# Patient Record
Sex: MALE | Race: WHITE | NOT HISPANIC OR LATINO | Employment: FULL TIME | URBAN - NONMETROPOLITAN AREA
[De-identification: names, ages, dates, MRNs, and addresses within clinical notes are randomized per-mention and may not be internally consistent; named-entity substitution may affect disease eponyms.]

---

## 2017-11-15 ENCOUNTER — OFFICE VISIT (OUTPATIENT)
Dept: UROLOGY | Facility: CLINIC | Age: 37
End: 2017-11-15

## 2017-11-15 VITALS — BODY MASS INDEX: 32.14 KG/M2 | HEIGHT: 66 IN | WEIGHT: 200 LBS

## 2017-11-15 DIAGNOSIS — Z30.09 ENCOUNTER FOR VASECTOMY COUNSELING: Primary | ICD-10-CM

## 2017-11-15 PROCEDURE — 99202 OFFICE O/P NEW SF 15 MIN: CPT | Performed by: UROLOGY

## 2017-11-15 RX ORDER — LEVOTHYROXINE SODIUM 175 UG/1
150 TABLET ORAL DAILY
Refills: 0 | COMMUNITY
Start: 2017-10-12

## 2017-11-15 RX ORDER — CARVEDILOL 6.25 MG/1
6.25 TABLET ORAL 2 TIMES DAILY WITH MEALS
Refills: 0 | COMMUNITY
Start: 2017-10-12

## 2017-11-15 RX ORDER — PANTOPRAZOLE SODIUM 40 MG/1
40 TABLET, DELAYED RELEASE ORAL DAILY
Refills: 0 | COMMUNITY
Start: 2017-10-14

## 2017-11-15 NOTE — PROGRESS NOTES
Chief Complaint:          Chief Complaint   Patient presents with   • Vasectomy Consult     New Patient        HPI:   37 y.o. male.    HPI  Pt and I discussed risks of vasectomy.  Infection, chronic pain, bleeding and recanalization.      Past Medical History:        Past Medical History:   Diagnosis Date   • Diabetes mellitus    • Hypertension          Current Meds:     Current Outpatient Prescriptions   Medication Sig Dispense Refill   • carvedilol (COREG) 6.25 MG tablet 6.25 mg.  0   • levothyroxine (SYNTHROID, LEVOTHROID) 175 MCG tablet 175 mcg.  0   • metFORMIN (GLUCOPHAGE) 500 MG tablet 500 mg.  0   • pantoprazole (PROTONIX) 40 MG EC tablet 40 mg.  0     No current facility-administered medications for this visit.         Allergies:      No Known Allergies     Past Surgical History:     History reviewed. No pertinent surgical history.      Social History:     Social History     Social History   • Marital status: Single     Spouse name: N/A   • Number of children: N/A   • Years of education: N/A     Occupational History   • Not on file.     Social History Main Topics   • Smoking status: Former Smoker   • Smokeless tobacco: Never Used   • Alcohol use No   • Drug use: No   • Sexual activity: Not on file     Other Topics Concern   • Not on file     Social History Narrative   • No narrative on file       Family History:     Family History   Problem Relation Age of Onset   • Hypertension Father    • Hypertension Mother    • Heart disease Mother    • Urolithiasis Maternal Uncle        Review of Systems:     Review of Systems    Physical Exam:     Physical Exam   Constitutional: He is oriented to person, place, and time.   HENT:   Head: Normocephalic and atraumatic.   Right Ear: External ear normal.   Left Ear: External ear normal.   Nose: Nose normal.   Mouth/Throat: Oropharynx is clear and moist.   Eyes: Conjunctivae and EOM are normal. Pupils are equal, round, and reactive to light.   Neck: Normal range of motion.  Neck supple. No thyromegaly present.   Cardiovascular: Normal rate, regular rhythm, normal heart sounds and intact distal pulses.    No murmur heard.  Pulmonary/Chest: Effort normal and breath sounds normal. No respiratory distress. He has no wheezes. He has no rales. He exhibits no tenderness.   Abdominal: Soft. Bowel sounds are normal. He exhibits no distension and no mass. There is no tenderness. No hernia.   Genitourinary: Penis normal.   Musculoskeletal: Normal range of motion. He exhibits no edema or tenderness.   Lymphadenopathy:     He has no cervical adenopathy.   Neurological: He is alert and oriented to person, place, and time. No cranial nerve deficit. He exhibits normal muscle tone. Coordination normal.   Skin: Skin is warm. No rash noted.   Psychiatric: He has a normal mood and affect. His behavior is normal. Judgment and thought content normal.   Nursing note and vitals reviewed.      Procedure:     No notes on file      Assessment:     Encounter Diagnosis   Name Primary?   • Encounter for vasectomy counseling Yes     No orders of the defined types were placed in this encounter.      Plan:   Schedule vasectomy office rx xanax    Counseling was given to patient for the following topics risks and benefits of treatment options. and the interim medical history and current results were reviewed.  A treatment plan with follow-up was made. Total time of the encounter was 26 minutes and 26 minutes were spent discussing Encounter for vasectomy counseling [Z30.09] face-to-face.       This document has been electronically signed by Gene Hopkins MD November 15, 2017 4:04 PM

## 2017-12-27 ENCOUNTER — PROCEDURE VISIT (OUTPATIENT)
Dept: UROLOGY | Facility: CLINIC | Age: 37
End: 2017-12-27

## 2017-12-27 DIAGNOSIS — Z30.2 ENCOUNTER FOR VASECTOMY: Primary | ICD-10-CM

## 2017-12-27 PROCEDURE — 55250 REMOVAL OF SPERM DUCT(S): CPT | Performed by: UROLOGY

## 2017-12-27 RX ORDER — CEPHALEXIN 250 MG/1
250 CAPSULE ORAL 3 TIMES DAILY
Qty: 21 CAPSULE | Refills: 0 | Status: SHIPPED | OUTPATIENT
Start: 2017-12-27 | End: 2020-11-06

## 2017-12-27 RX ORDER — HYDROCODONE BITARTRATE AND ACETAMINOPHEN 5; 325 MG/1; MG/1
TABLET ORAL
Qty: 20 TABLET | Refills: 0 | Status: SHIPPED | OUTPATIENT
Start: 2017-12-27 | End: 2020-11-06

## 2017-12-27 NOTE — PROGRESS NOTES
Chief Complaint:          Chief Complaint   Patient presents with   • Sterilization     Office Vasectomy       HPI:   37 y.o. male.    HPI        Past Medical History:        Past Medical History:   Diagnosis Date   • Diabetes mellitus    • Hypertension          Current Meds:     Current Outpatient Prescriptions   Medication Sig Dispense Refill   • carvedilol (COREG) 6.25 MG tablet 6.25 mg.  0   • cephalexin (KEFLEX) 250 MG capsule Take 1 capsule by mouth 3 (Three) Times a Day. 21 capsule 0   • HYDROcodone-acetaminophen (NORCO) 5-325 MG per tablet 1 to 2 Tablets Every 6 Hours as Needed for PAIN 20 tablet 0   • levothyroxine (SYNTHROID, LEVOTHROID) 175 MCG tablet 175 mcg.  0   • metFORMIN (GLUCOPHAGE) 500 MG tablet 500 mg.  0   • pantoprazole (PROTONIX) 40 MG EC tablet 40 mg.  0     No current facility-administered medications for this visit.         Allergies:      No Known Allergies     Past Surgical History:     History reviewed. No pertinent surgical history.      Social History:     Social History     Social History   • Marital status: Single     Spouse name: N/A   • Number of children: N/A   • Years of education: N/A     Occupational History   • Not on file.     Social History Main Topics   • Smoking status: Former Smoker   • Smokeless tobacco: Never Used   • Alcohol use No   • Drug use: No   • Sexual activity: Not on file     Other Topics Concern   • Not on file     Social History Narrative       Family History:     Family History   Problem Relation Age of Onset   • Hypertension Father    • Hypertension Mother    • Heart disease Mother    • Urolithiasis Maternal Uncle        Review of Systems:     Review of Systems  Vasectomy performed under lidocaine/marcaine local.  Midline penal scrotal incision performed then vas clamp placed on vas and sharpened hemostat used to dissect left vas.  Clips applied to proximal and distal vas and to specimen on left.  Identical procedure done on the right except after vas was  transected the proximal vas retracted into the wound and then it was retrieved with vas clamp and 2 clips reapplied.  Wound was closed with chromic.  Physical Exam:     Physical Exam    Procedure:     No notes on file      Assessment:     Encounter Diagnosis   Name Primary?   • Encounter for vasectomy Yes     No orders of the defined types were placed in this encounter.      Plan:   Will see pt in 2 months and he will do protected intercourse until negative sperm analysis    Counseling was given to patient for the following topics instructions for management. and the interim medical history and current results were reviewed.  A treatment plan with follow-up was made. Total time of the encounter was 35 minutes and 35 minutes were spent discussing Encounter for vasectomy [Z30.2] face-to-face.       This document has been electronically signed by Gene Hopkins MD December 27, 2017 3:41 PM

## 2018-02-19 ENCOUNTER — LAB (OUTPATIENT)
Dept: UROLOGY | Facility: CLINIC | Age: 38
End: 2018-02-19

## 2018-02-19 DIAGNOSIS — Z98.52 STATUS POST VASECTOMY: Primary | ICD-10-CM

## 2018-02-19 LAB — SPERM - POST VASECTOMY: NORMAL

## 2018-02-19 PROCEDURE — 89321 SEMEN ANAL SPERM DETECTION: CPT | Performed by: UROLOGY

## 2018-02-21 ENCOUNTER — TELEPHONE (OUTPATIENT)
Dept: UROLOGY | Facility: CLINIC | Age: 38
End: 2018-02-21

## 2018-02-21 NOTE — TELEPHONE ENCOUNTER
I called the patient and let him know that his semen sample had no sperm seen.  He asked if he still needed to come to his follow up and I told him yes so that Dr. Hardy could check the incision site.

## 2018-02-28 ENCOUNTER — OFFICE VISIT (OUTPATIENT)
Dept: UROLOGY | Facility: CLINIC | Age: 38
End: 2018-02-28

## 2018-02-28 VITALS — HEIGHT: 66 IN | BODY MASS INDEX: 32.14 KG/M2 | WEIGHT: 200 LBS

## 2018-02-28 DIAGNOSIS — Z98.52 STATUS POST VASECTOMY: Primary | ICD-10-CM

## 2018-02-28 PROCEDURE — 99024 POSTOP FOLLOW-UP VISIT: CPT | Performed by: UROLOGY

## 2018-02-28 NOTE — PROGRESS NOTES
Chief Complaint:          Chief Complaint   Patient presents with   • Vasectomy f/u     patient feels like he hasn't fully healed, feels like it's pulled a little tight.        HPI:   37 y.o. male.    HPI  Pt has some fullness on the right side.  His sperm analysis is negative.      Past Medical History:        Past Medical History:   Diagnosis Date   • Diabetes mellitus    • Hypertension          Current Meds:     Current Outpatient Prescriptions   Medication Sig Dispense Refill   • carvedilol (COREG) 6.25 MG tablet 6.25 mg.  0   • cephalexin (KEFLEX) 250 MG capsule Take 1 capsule by mouth 3 (Three) Times a Day. 21 capsule 0   • HYDROcodone-acetaminophen (NORCO) 5-325 MG per tablet 1 to 2 Tablets Every 6 Hours as Needed for PAIN 20 tablet 0   • levothyroxine (SYNTHROID, LEVOTHROID) 175 MCG tablet 175 mcg.  0   • metFORMIN (GLUCOPHAGE) 500 MG tablet 500 mg.  0   • pantoprazole (PROTONIX) 40 MG EC tablet 40 mg.  0     No current facility-administered medications for this visit.         Allergies:      No Known Allergies     Past Surgical History:     History reviewed. No pertinent surgical history.      Social History:     Social History     Social History   • Marital status: Single     Spouse name: N/A   • Number of children: N/A   • Years of education: N/A     Occupational History   • Not on file.     Social History Main Topics   • Smoking status: Former Smoker   • Smokeless tobacco: Never Used   • Alcohol use No   • Drug use: No   • Sexual activity: Not on file     Other Topics Concern   • Not on file     Social History Narrative       Family History:     Family History   Problem Relation Age of Onset   • Hypertension Father    • Hypertension Mother    • Heart disease Mother    • Urolithiasis Maternal Uncle        Review of Systems:     Review of Systems   Constitutional: Negative for chills, fatigue and fever.   HENT: Negative for congestion and sinus pain.    Eyes: Negative for pain.   Respiratory: Negative for  cough, chest tightness and wheezing.    Cardiovascular: Negative for chest pain.   Gastrointestinal: Negative for constipation, diarrhea, nausea and vomiting.   Endocrine: Negative for cold intolerance and heat intolerance.   Genitourinary: Positive for testicular pain. Negative for difficulty urinating, dysuria and hematuria.   Musculoskeletal: Negative for back pain and neck pain.   Skin: Negative for rash and wound.   Neurological: Negative for dizziness and headaches.   Hematological: Does not bruise/bleed easily.   Psychiatric/Behavioral: Negative for agitation. The patient is not nervous/anxious.            Physical Exam:     Physical Exam   Constitutional: He is oriented to person, place, and time.   HENT:   Head: Normocephalic and atraumatic.   Right Ear: External ear normal.   Left Ear: External ear normal.   Nose: Nose normal.   Mouth/Throat: Oropharynx is clear and moist.   Eyes: Conjunctivae and EOM are normal. Pupils are equal, round, and reactive to light.   Neck: Normal range of motion. Neck supple. No thyromegaly present.   Cardiovascular: Normal rate, regular rhythm, normal heart sounds and intact distal pulses.    No murmur heard.  Pulmonary/Chest: Effort normal and breath sounds normal. No respiratory distress. He has no wheezes. He has no rales. He exhibits no tenderness.   Abdominal: Soft. Bowel sounds are normal. He exhibits no distension and no mass. There is no tenderness. No hernia.   Genitourinary: Penis normal.   Genitourinary Comments: Pt has a little more sperm granuloma on right than left     Musculoskeletal: Normal range of motion. He exhibits no edema or tenderness.   Lymphadenopathy:     He has no cervical adenopathy.   Neurological: He is alert and oriented to person, place, and time. No cranial nerve deficit. He exhibits normal muscle tone. Coordination normal.   Skin: Skin is warm. No rash noted.   Psychiatric: He has a normal mood and affect. His behavior is normal. Judgment and  thought content normal.   Nursing note and vitals reviewed.      Procedure:     No notes on file      Assessment:     Encounter Diagnosis   Name Primary?   • Status post vasectomy Yes     No orders of the defined types were placed in this encounter.      Plan:   If pt still has right orchalgia will see him back in 6 months    Counseling was given to patient for the following topics instructions for management as follows: orchalgia. The interim medical history and current results were reviewed.  A treatment plan with follow-up was made for Status post vasectomy [Z98.52]. I spent 11 minutes face to face with Camilo Harper and 95 percentage was spent in counseling.    This document has been electronically signed by Gene Hopkins MD February 28, 2018 5:01 PM

## 2018-03-30 ENCOUNTER — TRANSCRIBE ORDERS (OUTPATIENT)
Dept: ADMINISTRATIVE | Facility: HOSPITAL | Age: 38
End: 2018-03-30

## 2018-03-30 ENCOUNTER — LAB (OUTPATIENT)
Dept: LAB | Facility: HOSPITAL | Age: 38
End: 2018-03-30

## 2018-03-30 DIAGNOSIS — E11.9 DIABETES MELLITUS WITHOUT COMPLICATION (HCC): ICD-10-CM

## 2018-03-30 DIAGNOSIS — E03.9 ACQUIRED HYPOTHYROIDISM: ICD-10-CM

## 2018-03-30 DIAGNOSIS — I10 BENIGN HYPERTENSION: Primary | ICD-10-CM

## 2018-03-30 DIAGNOSIS — I10 BENIGN HYPERTENSION: ICD-10-CM

## 2018-03-30 DIAGNOSIS — Z13.220 SCREENING FOR LIPOID DISORDERS: ICD-10-CM

## 2018-03-30 LAB
ALBUMIN SERPL-MCNC: 4.1 G/DL (ref 3.5–5)
ALBUMIN UR-MCNC: 22.5 MG/L
ALBUMIN/GLOB SERPL: 1.7 G/DL (ref 1.5–2.5)
ALP SERPL-CCNC: 115 U/L (ref 40–129)
ALT SERPL W P-5'-P-CCNC: 117 U/L (ref 10–44)
ANION GAP SERPL CALCULATED.3IONS-SCNC: 6.7 MMOL/L (ref 3.6–11.2)
AST SERPL-CCNC: 57 U/L (ref 10–34)
BASOPHILS # BLD AUTO: 0.03 10*3/MM3 (ref 0–0.3)
BASOPHILS NFR BLD AUTO: 0.3 % (ref 0–2)
BILIRUB SERPL-MCNC: 0.7 MG/DL (ref 0.2–1.8)
BUN BLD-MCNC: 9 MG/DL (ref 7–21)
BUN/CREAT SERPL: 12.2 (ref 7–25)
CALCIUM SPEC-SCNC: 8.9 MG/DL (ref 7.7–10)
CHLORIDE SERPL-SCNC: 105 MMOL/L (ref 99–112)
CHOLEST SERPL-MCNC: 108 MG/DL (ref 0–200)
CO2 SERPL-SCNC: 25.3 MMOL/L (ref 24.3–31.9)
CREAT BLD-MCNC: 0.74 MG/DL (ref 0.43–1.29)
DEPRECATED RDW RBC AUTO: 37.2 FL (ref 37–54)
EOSINOPHIL # BLD AUTO: 0.21 10*3/MM3 (ref 0–0.7)
EOSINOPHIL NFR BLD AUTO: 1.9 % (ref 0–5)
ERYTHROCYTE [DISTWIDTH] IN BLOOD BY AUTOMATED COUNT: 13.2 % (ref 11.5–14.5)
GFR SERPL CREATININE-BSD FRML MDRD: 120 ML/MIN/1.73
GLOBULIN UR ELPH-MCNC: 2.4 GM/DL
GLUCOSE BLD-MCNC: 249 MG/DL (ref 70–110)
HBA1C MFR BLD: 9.6 % (ref 4.5–5.7)
HCT VFR BLD AUTO: 43.3 % (ref 42–52)
HDLC SERPL-MCNC: 33 MG/DL (ref 60–100)
HGB BLD-MCNC: 15.7 G/DL (ref 14–18)
IMM GRANULOCYTES # BLD: 0.08 10*3/MM3 (ref 0–0.03)
IMM GRANULOCYTES NFR BLD: 0.7 % (ref 0–0.5)
LDLC SERPL CALC-MCNC: 49 MG/DL (ref 0–100)
LDLC/HDLC SERPL: 1.5 {RATIO}
LYMPHOCYTES # BLD AUTO: 3.49 10*3/MM3 (ref 1–3)
LYMPHOCYTES NFR BLD AUTO: 31 % (ref 21–51)
MCH RBC QN AUTO: 29 PG (ref 27–33)
MCHC RBC AUTO-ENTMCNC: 36.3 G/DL (ref 33–37)
MCV RBC AUTO: 80 FL (ref 80–94)
MONOCYTES # BLD AUTO: 0.93 10*3/MM3 (ref 0.1–0.9)
MONOCYTES NFR BLD AUTO: 8.3 % (ref 0–10)
NEUTROPHILS # BLD AUTO: 6.51 10*3/MM3 (ref 1.4–6.5)
NEUTROPHILS NFR BLD AUTO: 57.8 % (ref 30–70)
OSMOLALITY SERPL CALC.SUM OF ELEC: 280.9 MOSM/KG (ref 273–305)
PLATELET # BLD AUTO: 239 10*3/MM3 (ref 130–400)
PMV BLD AUTO: 11.3 FL (ref 6–10)
POTASSIUM BLD-SCNC: 3.6 MMOL/L (ref 3.5–5.3)
PROT SERPL-MCNC: 6.5 G/DL (ref 6–8)
RBC # BLD AUTO: 5.41 10*6/MM3 (ref 4.7–6.1)
SODIUM BLD-SCNC: 137 MMOL/L (ref 135–153)
T4 SERPL-MCNC: 12 MCG/DL (ref 4.5–10.9)
TRIGL SERPL-MCNC: 128 MG/DL (ref 0–150)
TSH SERPL DL<=0.05 MIU/L-ACNC: 0.03 MIU/ML (ref 0.55–4.78)
VLDLC SERPL-MCNC: 25.6 MG/DL
WBC NRBC COR # BLD: 11.25 10*3/MM3 (ref 4.5–12.5)

## 2018-03-30 PROCEDURE — 80061 LIPID PANEL: CPT

## 2018-03-30 PROCEDURE — 82043 UR ALBUMIN QUANTITATIVE: CPT

## 2018-03-30 PROCEDURE — 84436 ASSAY OF TOTAL THYROXINE: CPT

## 2018-03-30 PROCEDURE — 80053 COMPREHEN METABOLIC PANEL: CPT

## 2018-03-30 PROCEDURE — 84443 ASSAY THYROID STIM HORMONE: CPT

## 2018-03-30 PROCEDURE — 83036 HEMOGLOBIN GLYCOSYLATED A1C: CPT

## 2018-03-30 PROCEDURE — 36415 COLL VENOUS BLD VENIPUNCTURE: CPT

## 2018-03-30 PROCEDURE — 85025 COMPLETE CBC W/AUTO DIFF WBC: CPT

## 2018-08-02 ENCOUNTER — OFFICE VISIT (OUTPATIENT)
Dept: UROLOGY | Facility: CLINIC | Age: 38
End: 2018-08-02

## 2018-08-02 VITALS — HEIGHT: 66 IN | WEIGHT: 200 LBS | BODY MASS INDEX: 32.14 KG/M2

## 2018-08-02 DIAGNOSIS — N50.819 ORCHALGIA: Primary | ICD-10-CM

## 2018-08-02 PROCEDURE — 99213 OFFICE O/P EST LOW 20 MIN: CPT | Performed by: UROLOGY

## 2018-08-02 NOTE — PROGRESS NOTES
Chief Complaint:          Right orchalgia    HPI:   38 y.o. male.  Pt had a vasectomy back in [facility] and had testicle ache that lasted about 6 weeks and then it went away until 1 week ago when the right side felt squeezed.  This squeezing feeling has progressively gotten worse.  No fever or chills.  HPI      Past Medical History:        Past Medical History:   Diagnosis Date   • Diabetes mellitus (CMS/HCC)    • Hypertension          Current Meds:     Current Outpatient Prescriptions   Medication Sig Dispense Refill   • carvedilol (COREG) 6.25 MG tablet 6.25 mg.  0   • levothyroxine (SYNTHROID, LEVOTHROID) 175 MCG tablet 175 mcg.  0   • metFORMIN (GLUCOPHAGE) 500 MG tablet 500 mg.  0   • pantoprazole (PROTONIX) 40 MG EC tablet 40 mg.  0   • cephalexin (KEFLEX) 250 MG capsule Take 1 capsule by mouth 3 (Three) Times a Day. 21 capsule 0   • HYDROcodone-acetaminophen (NORCO) 5-325 MG per tablet 1 to 2 Tablets Every 6 Hours as Needed for PAIN 20 tablet 0     No current facility-administered medications for this visit.         Allergies:      No Known Allergies     Past Surgical History:     No past surgical history on file.      Social History:     Social History     Social History   • Marital status: Single     Spouse name: N/A   • Number of children: N/A   • Years of education: N/A     Occupational History   • Not on file.     Social History Main Topics   • Smoking status: Former Smoker   • Smokeless tobacco: Never Used   • Alcohol use No   • Drug use: No   • Sexual activity: Not on file     Other Topics Concern   • Not on file     Social History Narrative   • No narrative on file       Family History:     Family History   Problem Relation Age of Onset   • Hypertension Father    • Hypertension Mother    • Heart disease Mother    • Urolithiasis Maternal Uncle        Review of Systems:     Review of Systems   Constitutional: Negative for chills, fatigue and fever.   Respiratory: Negative for cough, shortness of breath  "and wheezing.    Cardiovascular: Negative for leg swelling.   Gastrointestinal: Negative for abdominal pain, nausea and vomiting.   Musculoskeletal: Negative for back pain and joint swelling.   Neurological: Negative for dizziness and headaches.   Psychiatric/Behavioral: Negative for confusion.       I have reviewed the follow portions of the patient's history and confirmed they are accurate today:  allergies, current medications, past family history, past medical history, past social history, past surgical history, problem list and ROS  Physical Exam:     Physical Exam   Constitutional: He is oriented to person, place, and time.   HENT:   Head: Normocephalic and atraumatic.   Right Ear: External ear normal.   Left Ear: External ear normal.   Nose: Nose normal.   Mouth/Throat: Oropharynx is clear and moist.   Eyes: Pupils are equal, round, and reactive to light. Conjunctivae and EOM are normal.   Neck: Normal range of motion. Neck supple. No thyromegaly present.   Cardiovascular: Normal rate, regular rhythm, normal heart sounds and intact distal pulses.    No murmur heard.  Pulmonary/Chest: Effort normal and breath sounds normal. No respiratory distress. He has no wheezes. He has no rales. He exhibits no tenderness.   Abdominal: Soft. Bowel sounds are normal. He exhibits no distension and no mass. There is no tenderness. No hernia.   Genitourinary: Rectum normal, prostate normal and penis normal.   Musculoskeletal: Normal range of motion. He exhibits no edema or tenderness.   Lymphadenopathy:     He has no cervical adenopathy.   Neurological: He is alert and oriented to person, place, and time. No cranial nerve deficit. He exhibits normal muscle tone. Coordination normal.   Skin: Skin is warm. No rash noted.   Psychiatric: He has a normal mood and affect. His behavior is normal. Judgment and thought content normal.   Nursing note and vitals reviewed.      Ht 167.6 cm (65.98\")   Wt 90.7 kg (200 lb)   BMI 32.30 " kg/m²    Procedure:         Assessment:     Encounter Diagnosis   Name Primary?   • Orchalgia Yes     No orders of the defined types were placed in this encounter.      Plan:   I would have pt try motrin and will see pt back in 2 months    Patient's Body mass index is 32.3 kg/m². BMI is above normal parameters. Recommendations include: educational material.      Counseling was given to patient for the following topics instructions for management as follows: testicular pain. The interim medical history and current results were reviewed.  A treatment plan with follow-up was made for Orchalgia [N50.819].  This document has been electronically signed by Gene Hopkins MD August 2, 2018 4:01 PM

## 2018-10-17 ENCOUNTER — OFFICE VISIT (OUTPATIENT)
Dept: CARDIOLOGY | Facility: CLINIC | Age: 38
End: 2018-10-17

## 2018-10-17 ENCOUNTER — CLINICAL SUPPORT (OUTPATIENT)
Dept: CARDIOLOGY | Facility: CLINIC | Age: 38
End: 2018-10-17

## 2018-10-17 VITALS
DIASTOLIC BLOOD PRESSURE: 75 MMHG | HEIGHT: 66 IN | BODY MASS INDEX: 33.27 KG/M2 | SYSTOLIC BLOOD PRESSURE: 119 MMHG | WEIGHT: 207 LBS | RESPIRATION RATE: 16 BRPM | HEART RATE: 96 BPM

## 2018-10-17 DIAGNOSIS — R94.31 ABNORMAL EKG: ICD-10-CM

## 2018-10-17 DIAGNOSIS — R42 DIZZINESS: ICD-10-CM

## 2018-10-17 DIAGNOSIS — Z82.49 FAMILY HISTORY OF PREMATURE CORONARY ARTERY DISEASE: ICD-10-CM

## 2018-10-17 DIAGNOSIS — E11.9 TYPE 2 DIABETES MELLITUS WITHOUT COMPLICATION, WITHOUT LONG-TERM CURRENT USE OF INSULIN (HCC): ICD-10-CM

## 2018-10-17 DIAGNOSIS — I10 ESSENTIAL HYPERTENSION: ICD-10-CM

## 2018-10-17 DIAGNOSIS — R42 DIZZINESS: Primary | ICD-10-CM

## 2018-10-17 PROCEDURE — 99204 OFFICE O/P NEW MOD 45 MIN: CPT | Performed by: INTERNAL MEDICINE

## 2018-10-17 PROCEDURE — 93000 ELECTROCARDIOGRAM COMPLETE: CPT | Performed by: INTERNAL MEDICINE

## 2018-10-17 PROCEDURE — 93270 REMOTE 30 DAY ECG REV/REPORT: CPT | Performed by: INTERNAL MEDICINE

## 2018-10-17 RX ORDER — VARENICLINE TARTRATE 1 MG/1
1 TABLET, FILM COATED ORAL 2 TIMES DAILY
COMMUNITY
End: 2020-11-06

## 2018-10-17 RX ORDER — ASPIRIN 81 MG/1
81 TABLET ORAL DAILY
COMMUNITY

## 2018-10-17 NOTE — PROGRESS NOTES
Stephanie Green APRN  Camilo Harper  1980  10/17/2018    Patient Active Problem List   Diagnosis   • Cough   • Dizziness   • Abnormal EKG   • Essential hypertension   • Type 2 diabetes mellitus without complication, without long-term current use of insulin (CMS/[facility])   • Family history of premature coronary artery disease       Dear Stephanie Green APRN:    Subjective     Camilo Harper is a 38 y.o. male with the problems as listed above, presents    Chief complaint: Recent episodes of dizziness and abnormal EKG    History of Present Illness: Mr. Harper is a pleasant 38-year-old  male with no history of known coronary artery disease or structural heart disease or cardiac arrhythmias, has had some episodes of dizziness recently in July 2018 for which she went to urgent care center and had an EKG that apparently was noted to be abnormal.  He was ultimately seen by his PCP and now referred here for further cardiac evaluation management.  On further questioning he states that he does get intermittent episodes of dizziness and feels like he is in a fog at times and not able to concentrate and feels like he is drunk at times..  He has checked his sugar and apparently has been running okay.  His blood pressure in our office today is normal at 119/75 mmHg.  He denies any history of syncope.  He denies any chest pains or shortness of breath.  He denies any symptoms of palpitations associated with dizziness.  I reviewed his EKG from July 15, 2018 which shows sinus tachycardia at a rate of 106 bpm with Q waves across leads V1 through V3 and poor R wave progression across leads V1 through V6 suspicious for a possible anteroseptal myocardial infarction of undetermined age.  His EKG today in our office reveals sinus tachycardia at a rate of 105 bpm with again poor R wave progression across leads V1 through V5 with questionable old and septal myocardial infarction.  There also possibly some Q waves  in leads 3 and aVF.  Patient denies any previous history of known heart attacks.  He has history of smoking up to 2 packs a day for 20 years but quit about a year ago.  He has history of hypertension and type 2 diabetes mellitus.  He has positive family history of heart disease with his dad having had CABG in his 60s and mom with aortic valve replacement.  Has some occasional headaches.    Cardiac risk factors:diabetes mellitus, hypertension, smoking, Positive family Hx. of premature athersclerotivc disease., Obesity and Male gender    No Known Allergies:      Current Outpatient Prescriptions:   •  aspirin 81 MG EC tablet, Take 81 mg by mouth Daily., Disp: , Rfl:   •  carvedilol (COREG) 6.25 MG tablet, 6.25 mg 2 (Two) Times a Day With Meals., Disp: , Rfl: 0  •  levothyroxine (SYNTHROID, LEVOTHROID) 175 MCG tablet, 150 mcg Daily., Disp: , Rfl: 0  •  metFORMIN (GLUCOPHAGE) 500 MG tablet, 1,000 mg 2 (Two) Times a Day With Meals., Disp: , Rfl: 0  •  pantoprazole (PROTONIX) 40 MG EC tablet, 40 mg., Disp: , Rfl: 0  •  varenicline (CHANTIX) 1 MG tablet, Take 1 mg by mouth 2 (Two) Times a Day., Disp: , Rfl:   •  cephalexin (KEFLEX) 250 MG capsule, Take 1 capsule by mouth 3 (Three) Times a Day., Disp: 21 capsule, Rfl: 0  •  HYDROcodone-acetaminophen (NORCO) 5-325 MG per tablet, 1 to 2 Tablets Every 6 Hours as Needed for PAIN, Disp: 20 tablet, Rfl: 0    Past Medical History:   Diagnosis Date   • Diabetes mellitus (CMS/HCC)    • Hypertension    • Thyroid disease      Past Surgical History:   Procedure Laterality Date   • VASECTOMY       Family History   Problem Relation Age of Onset   • Hypertension Father    • Heart disease Father    • Hypertension Mother    • Heart disease Mother    • Urolithiasis Maternal Uncle      Social History   Substance Use Topics   • Smoking status: Former Smoker     Packs/day: 2.00     Types: Cigarettes     Quit date: 2017   • Smokeless tobacco: Never Used   • Alcohol use No       Review of Systems  "  Constitution: Positive for fever and malaise/fatigue.   Eyes: Positive for visual disturbance.   Cardiovascular: Positive for chest pain.   Respiratory: Positive for shortness of breath.    Gastrointestinal: Positive for abdominal pain and heartburn.   Neurological: Positive for headaches, light-headedness, numbness and paresthesias.       Objective   Blood pressure 119/75, pulse 96, resp. rate 16, height 167.6 cm (66\"), weight 93.9 kg (207 lb).  Body mass index is 33.41 kg/m².      Physical Exam   Constitutional: He is oriented to person, place, and time. He appears well-developed and well-nourished.   HENT:   Mouth/Throat: Oropharynx is clear and moist.   Eyes: Pupils are equal, round, and reactive to light. EOM are normal.   Neck: Neck supple. No JVD present. No tracheal deviation present. No thyromegaly present.   Cardiovascular: Normal rate, regular rhythm, S1 normal and S2 normal.  Exam reveals no gallop and no friction rub.    No murmur heard.  Pulmonary/Chest: Effort normal and breath sounds normal.   Abdominal: Soft. Bowel sounds are normal. He exhibits no mass. There is no tenderness.   Musculoskeletal: Normal range of motion. He exhibits no edema.   Lymphadenopathy:     He has no cervical adenopathy.   Neurological: He is alert and oriented to person, place, and time.   No focal neurological deficits noted.   Skin: Skin is warm and dry. No rash noted.   Psychiatric: He has a normal mood and affect.       Lab Results   Component Value Date     03/30/2018    K 3.6 03/30/2018     03/30/2018    CO2 25.3 03/30/2018    BUN 9 03/30/2018    CREATININE 0.74 03/30/2018    GLUCOSE 249 (H) 03/30/2018    CALCIUM 8.9 03/30/2018    AST 57 (H) 03/30/2018     (H) 03/30/2018    ALKPHOS 115 03/30/2018     No results found for: CKTOTAL  Lab Results   Component Value Date    WBC 11.25 03/30/2018    HGB 15.7 03/30/2018    HCT 43.3 03/30/2018     03/30/2018     No results found for: INR  No results " found for: MG  Lab Results   Component Value Date    TSH 0.028 (L) 03/30/2018    PSA 0.24 01/24/2016    TRIG 128 03/30/2018    HDL 33 (L) 03/30/2018    LDL 49 03/30/2018        ECG 12 Lead  Date/Time: 10/17/2018 3:11 PM  Performed by: JORDEN PARK  Authorized by: JORDEN PARK   Rhythm: sinus tachycardia  BPM: 105  Other findings: PRWP  Comments: Poor R wave progression across leads V1 through V5 with a questionable old tarun septal myocardial infarction.              Assessment/Plan    Diagnosis Plan   1. Dizziness  Cardiac Event Monitor   2. Abnormal EKG  Adult Stress Echo W/ Cont or Stress Agent if Necessary Per Protocol   3. Essential hypertension     4. Type 2 diabetes mellitus without complication, without long-term current use of insulin (CMS/[facility])  Lipid Panel    Comprehensive Metabolic Panel   5. Family history of premature coronary artery disease  Adult Stress Echo W/ Cont or Stress Agent if Necessary Per Protocol        Recommendations:  1. Continue with aspirin and carvedilol.  2. Will evaluate further with a stress echo study and event monitor.  3. Check fasting lipid panel and consider adding a statin if needed.  4. I told him to keep a check on the blood pressure, especially when he has dizzy spells and let us know if it's running less than 110 on the top.  5. If the cardiac workup is negative then we may have to consider referring him to a neurologist  6. Follow-up in 3-4 weeks.    Return in about 4 weeks (around 11/14/2018).    As always, Stephanie Green, HANNA  I appreciate very much the opportunity to participate in the cardiovascular care of your patients. Please do not hesitate to call me with any questions with regards to Camilo Harper evaluation and management.       With Best Regards,        Jorden Park MD, [facility]    Dragon disclaimer:  Much of this encounter note is an electronic transcription/translation of spoken language to printed text. The electronic translation of spoken  language may permit erroneous, or at times, nonsensical words or phrases to be inadvertently transcribed; Although I have reviewed the note for such errors, some may still exist.

## 2018-10-25 DIAGNOSIS — R42 DIZZINESS: ICD-10-CM

## 2018-10-30 ENCOUNTER — APPOINTMENT (OUTPATIENT)
Dept: CARDIOLOGY | Facility: HOSPITAL | Age: 38
End: 2018-10-30
Attending: INTERNAL MEDICINE

## 2018-11-01 ENCOUNTER — LAB (OUTPATIENT)
Dept: LAB | Facility: HOSPITAL | Age: 38
End: 2018-11-01
Attending: INTERNAL MEDICINE

## 2018-11-01 DIAGNOSIS — E11.9 TYPE 2 DIABETES MELLITUS WITHOUT COMPLICATION, WITHOUT LONG-TERM CURRENT USE OF INSULIN (HCC): ICD-10-CM

## 2018-11-01 LAB
ALBUMIN SERPL-MCNC: 4.5 G/DL (ref 3.5–5)
ALBUMIN/GLOB SERPL: 1.7 G/DL (ref 1.5–2.5)
ALP SERPL-CCNC: 79 U/L (ref 40–129)
ALT SERPL W P-5'-P-CCNC: 109 U/L (ref 10–44)
ANION GAP SERPL CALCULATED.3IONS-SCNC: 8.1 MMOL/L (ref 3.6–11.2)
AST SERPL-CCNC: 60 U/L (ref 10–34)
BILIRUB SERPL-MCNC: 1 MG/DL (ref 0.2–1.8)
BUN BLD-MCNC: 8 MG/DL (ref 7–21)
BUN/CREAT SERPL: 11 (ref 7–25)
CALCIUM SPEC-SCNC: 9.2 MG/DL (ref 7.7–10)
CHLORIDE SERPL-SCNC: 104 MMOL/L (ref 99–112)
CHOLEST SERPL-MCNC: 130 MG/DL (ref 0–200)
CO2 SERPL-SCNC: 25.9 MMOL/L (ref 24.3–31.9)
CREAT BLD-MCNC: 0.73 MG/DL (ref 0.43–1.29)
GFR SERPL CREATININE-BSD FRML MDRD: 121 ML/MIN/1.73
GLOBULIN UR ELPH-MCNC: 2.6 GM/DL
GLUCOSE BLD-MCNC: 153 MG/DL (ref 70–110)
HDLC SERPL-MCNC: 40 MG/DL (ref 60–100)
LDLC SERPL CALC-MCNC: 64 MG/DL (ref 0–100)
LDLC/HDLC SERPL: 1.59 {RATIO}
OSMOLALITY SERPL CALC.SUM OF ELEC: 277 MOSM/KG (ref 273–305)
POTASSIUM BLD-SCNC: 3.9 MMOL/L (ref 3.5–5.3)
PROT SERPL-MCNC: 7.1 G/DL (ref 6–8)
SODIUM BLD-SCNC: 138 MMOL/L (ref 135–153)
TRIGL SERPL-MCNC: 132 MG/DL (ref 0–150)
VLDLC SERPL-MCNC: 26.4 MG/DL

## 2018-11-01 PROCEDURE — 80053 COMPREHEN METABOLIC PANEL: CPT

## 2018-11-01 PROCEDURE — 80061 LIPID PANEL: CPT

## 2018-11-01 PROCEDURE — 36415 COLL VENOUS BLD VENIPUNCTURE: CPT

## 2018-11-12 ENCOUNTER — TELEPHONE (OUTPATIENT)
Dept: CARDIOLOGY | Facility: CLINIC | Age: 38
End: 2018-11-12

## 2018-11-12 NOTE — TELEPHONE ENCOUNTER
Called pt and advised him of his lipid panel. He expressed understanding.     He is also wanting to know the results to his CMP.         Notes recorded by Sesar Carter PA-C on 11/1/2018 at 10:30 AM EDT  Cholesterol looks good.

## 2018-11-13 NOTE — TELEPHONE ENCOUNTER
His sugar was slightly elevated at 153 and his liver enzymes were mildly elevated as well. Can we forward this to his PCP and advise him that we doing this. Also tell him he should ask his pcp to look into this further as I do not see him on any medications that could cause this.

## 2018-11-15 PROCEDURE — 93272 ECG/REVIEW INTERPRET ONLY: CPT | Performed by: INTERNAL MEDICINE

## 2018-11-29 ENCOUNTER — OFFICE VISIT (OUTPATIENT)
Dept: CARDIOLOGY | Facility: CLINIC | Age: 38
End: 2018-11-29

## 2018-11-29 VITALS
WEIGHT: 212.4 LBS | HEART RATE: 97 BPM | BODY MASS INDEX: 34.13 KG/M2 | SYSTOLIC BLOOD PRESSURE: 129 MMHG | RESPIRATION RATE: 16 BRPM | DIASTOLIC BLOOD PRESSURE: 78 MMHG | HEIGHT: 66 IN

## 2018-11-29 DIAGNOSIS — I10 ESSENTIAL HYPERTENSION: Primary | ICD-10-CM

## 2018-11-29 DIAGNOSIS — E11.9 TYPE 2 DIABETES MELLITUS WITHOUT COMPLICATION, WITHOUT LONG-TERM CURRENT USE OF INSULIN (HCC): ICD-10-CM

## 2018-11-29 DIAGNOSIS — R94.31 ABNORMAL EKG: ICD-10-CM

## 2018-11-29 DIAGNOSIS — R42 DIZZINESS: ICD-10-CM

## 2018-11-29 DIAGNOSIS — Z82.49 FAMILY HISTORY OF PREMATURE CORONARY ARTERY DISEASE: ICD-10-CM

## 2018-11-29 PROCEDURE — 99213 OFFICE O/P EST LOW 20 MIN: CPT | Performed by: PHYSICIAN ASSISTANT

## 2018-11-29 NOTE — PROGRESS NOTES
Stephanie Green APRN  Camilo Harper  1980 11/29/2018    Patient Active Problem List   Diagnosis   • Cough   • Dizziness   • Abnormal EKG   • Essential hypertension   • Type 2 diabetes mellitus without complication, without long-term current use of insulin (CMS/[facility])   • Family history of premature coronary artery disease       Dear Stephanie Green APRN:    Subjective       History of Present Illness:    Chief Complaint   Patient presents with   • Results     Event monitor        Camilo Harper is a pleasant 38 y.o. male with a past medical history significant for no history of known coronary artery disease or structural heart disease or cardiac arrhythmias, he has a history of tobacco abuse for 20 years.  Has not smoked in over a year, type 2 diabetes mellitus non-insulin-dependent, family history of coronary artery disease with his father having CABG in his 60s and his mother having aortic valve replacement.  Patient comes in for routine cardiology follow-up regarding recent cardiac event monitor.    Patient's cardiac event monitor after reviewing with Dr. Vaughn believes it is most likely sinus tachycardia given his age as the only finding.  Patient was to also undergo a stress test however due to cost is unable to get this done.    Patient states that since he was last seen his dizzy spells have resolved. He states that he was treated for a ear infection and it resolved after. Denies any weakness, fatigue, or palpitations. Denies any symptoms while wearing the cardiac monitor such as dizziness.     No Known Allergies:      Current Outpatient Medications:   •  aspirin 81 MG EC tablet, Take 81 mg by mouth Daily., Disp: , Rfl:   •  carvedilol (COREG) 6.25 MG tablet, 6.25 mg 2 (Two) Times a Day With Meals., Disp: , Rfl: 0  •  levothyroxine (SYNTHROID, LEVOTHROID) 175 MCG tablet, 150 mcg Daily., Disp: , Rfl: 0  •  metFORMIN (GLUCOPHAGE) 500 MG tablet, 1,000 mg 2 (Two) Times a Day With Meals.,  "Disp: , Rfl: 0  •  pantoprazole (PROTONIX) 40 MG EC tablet, 40 mg., Disp: , Rfl: 0  •  varenicline (CHANTIX) 1 MG tablet, Take 1 mg by mouth 2 (Two) Times a Day., Disp: , Rfl:   •  cephalexin (KEFLEX) 250 MG capsule, Take 1 capsule by mouth 3 (Three) Times a Day., Disp: 21 capsule, Rfl: 0  •  HYDROcodone-acetaminophen (NORCO) 5-325 MG per tablet, 1 to 2 Tablets Every 6 Hours as Needed for PAIN, Disp: 20 tablet, Rfl: 0      The following portions of the patient's history were reviewed and updated as appropriate: allergies, current medications, past family history, past medical history, past social history, past surgical history and problem list.    Social History     Tobacco Use   • Smoking status: Former Smoker     Packs/day: 2.00     Types: Cigarettes     Last attempt to quit: 2017     Years since quittin.9   • Smokeless tobacco: Never Used   Substance Use Topics   • Alcohol use: No   • Drug use: No       Review of Systems   Constitution: Negative for weakness and malaise/fatigue.   Cardiovascular: Negative for chest pain, dyspnea on exertion, irregular heartbeat, leg swelling, palpitations and syncope.   Respiratory: Negative for cough and shortness of breath.    Hematologic/Lymphatic: Negative for bleeding problem. Does not bruise/bleed easily.   Gastrointestinal: Negative for nausea and vomiting.   Neurological: Negative for dizziness.       Objective   Vitals:    18 1449   BP: 129/78   BP Location: Left arm   Pulse: 97   Resp: 16   Weight: 96.3 kg (212 lb 6.4 oz)   Height: 167.6 cm (65.98\")     Body mass index is 34.3 kg/m².        Physical Exam   Constitutional: He is oriented to person, place, and time. He appears well-developed and well-nourished. No distress.   HENT:   Head: Normocephalic and atraumatic.   Cardiovascular: Normal rate, regular rhythm, normal heart sounds and intact distal pulses.   Pulmonary/Chest: Effort normal and breath sounds normal. No respiratory distress.   Musculoskeletal: " He exhibits no edema.   Neurological: He is alert and oriented to person, place, and time.   Skin: He is not diaphoretic.       Lab Results   Component Value Date     11/01/2018    K 3.9 11/01/2018     11/01/2018    CO2 25.9 11/01/2018    BUN 8 11/01/2018    CREATININE 0.73 11/01/2018    GLUCOSE 153 (H) 11/01/2018    CALCIUM 9.2 11/01/2018    AST 60 (H) 11/01/2018     (H) 11/01/2018    ALKPHOS 79 11/01/2018     No results found for: CKTOTAL  Lab Results   Component Value Date    WBC 11.25 03/30/2018    HGB 15.7 03/30/2018    HCT 43.3 03/30/2018     03/30/2018     No results found for: INR  No results found for: MG  Lab Results   Component Value Date    TSH 0.028 (L) 03/30/2018    PSA 0.24 01/24/2016    TRIG 132 11/01/2018    HDL 40 (L) 11/01/2018    LDL 64 11/01/2018      No results found for: BNP    During this visit the following were done:  Labs Reviewed [x]    Labs Ordered []    Radiology Reports Reviewed [x]    Radiology Ordered []    PCP Records Reviewed []    Referring Provider Records Reviewed []    ER Records Reviewed []    Hospital Records Reviewed []    History Obtained From Family []    Radiology Images Reviewed []    Other Reviewed []    Records Requested []       Procedures      Assessment/Plan    Diagnosis Plan   1. Essential hypertension     2. Abnormal EKG  Treadmill Stress Test   3. Type 2 diabetes mellitus without complication, without long-term current use of insulin (CMS/[facility])     4. Dizziness     5. Family history of premature coronary artery disease                  Recommendations:  1. I reviewed his cardiac event monitor with him.  2. To help lower the cost of the stress echo I ordered a treadmill stress test instead so that this might be more affordable for him.  I did encourage him that would still like to have this done due to his abnormal EKG.  3. Follow-up in 2-3 months.    Return in about 3 months (around 2/28/2019).    As always, I appreciate very much the  opportunity to participate in the cardiovascular care of your patients.      With Best Regards,    JEFFERSON Cobb disclaimer:  Much of this encounter note is an electronic transcription/translation of spoken language to printed text. The electronic translation of spoken language may permit erroneous, or at times, nonsensical words or phrases to be inadvertently transcribed; Although I have reviewed the note for such errors, some may still exist.

## 2018-12-04 ENCOUNTER — TELEPHONE (OUTPATIENT)
Dept: CARDIOLOGY | Facility: CLINIC | Age: 38
End: 2018-12-04

## 2019-09-12 ENCOUNTER — TRANSCRIBE ORDERS (OUTPATIENT)
Dept: ADMINISTRATIVE | Facility: HOSPITAL | Age: 39
End: 2019-09-12

## 2019-09-12 ENCOUNTER — APPOINTMENT (OUTPATIENT)
Dept: LAB | Facility: HOSPITAL | Age: 39
End: 2019-09-12

## 2019-09-12 DIAGNOSIS — E03.9 HYPOTHYROIDISM, UNSPECIFIED TYPE: ICD-10-CM

## 2019-09-12 DIAGNOSIS — E11.9 DIABETES MELLITUS WITHOUT COMPLICATION (HCC): Primary | ICD-10-CM

## 2019-09-12 LAB
ANION GAP SERPL CALCULATED.3IONS-SCNC: 14.2 MMOL/L (ref 5–15)
BUN BLD-MCNC: 8 MG/DL (ref 6–20)
BUN/CREAT SERPL: 11.1 (ref 7–25)
CALCIUM SPEC-SCNC: 9.5 MG/DL (ref 8.6–10.5)
CHLORIDE SERPL-SCNC: 101 MMOL/L (ref 98–107)
CO2 SERPL-SCNC: 24.8 MMOL/L (ref 22–29)
CREAT BLD-MCNC: 0.72 MG/DL (ref 0.76–1.27)
GFR SERPL CREATININE-BSD FRML MDRD: 122 ML/MIN/1.73
GLUCOSE BLD-MCNC: 129 MG/DL (ref 65–99)
HBA1C MFR BLD: 6.9 % (ref 4.8–5.6)
POTASSIUM BLD-SCNC: 4.4 MMOL/L (ref 3.5–5.2)
SODIUM BLD-SCNC: 140 MMOL/L (ref 136–145)
T4 FREE SERPL-MCNC: 1.86 NG/DL (ref 0.93–1.7)
TSH SERPL DL<=0.05 MIU/L-ACNC: 0.29 UIU/ML (ref 0.27–4.2)

## 2019-09-12 PROCEDURE — 83036 HEMOGLOBIN GLYCOSYLATED A1C: CPT | Performed by: NURSE PRACTITIONER

## 2019-09-12 PROCEDURE — 84443 ASSAY THYROID STIM HORMONE: CPT | Performed by: NURSE PRACTITIONER

## 2019-09-12 PROCEDURE — 84439 ASSAY OF FREE THYROXINE: CPT | Performed by: NURSE PRACTITIONER

## 2019-09-12 PROCEDURE — 36415 COLL VENOUS BLD VENIPUNCTURE: CPT | Performed by: NURSE PRACTITIONER

## 2019-09-12 PROCEDURE — 80048 BASIC METABOLIC PNL TOTAL CA: CPT | Performed by: NURSE PRACTITIONER

## 2020-03-20 ENCOUNTER — LAB (OUTPATIENT)
Dept: LAB | Facility: HOSPITAL | Age: 40
End: 2020-03-20

## 2020-03-20 ENCOUNTER — TRANSCRIBE ORDERS (OUTPATIENT)
Dept: ADMINISTRATIVE | Facility: HOSPITAL | Age: 40
End: 2020-03-20

## 2020-03-20 DIAGNOSIS — D72.829 LEUKOCYTOSIS, UNSPECIFIED TYPE: ICD-10-CM

## 2020-03-20 DIAGNOSIS — E11.9 DIABETES MELLITUS WITHOUT COMPLICATION (HCC): ICD-10-CM

## 2020-03-20 DIAGNOSIS — E11.9 DIABETES MELLITUS, STABLE (HCC): ICD-10-CM

## 2020-03-20 DIAGNOSIS — E07.9 DISORDER OF THYROID GLAND: ICD-10-CM

## 2020-03-20 DIAGNOSIS — E07.9 DISORDER OF THYROID GLAND: Primary | ICD-10-CM

## 2020-03-20 LAB
ANION GAP SERPL CALCULATED.3IONS-SCNC: 12.1 MMOL/L (ref 5–15)
BASOPHILS # BLD AUTO: 0.06 10*3/MM3 (ref 0–0.2)
BASOPHILS NFR BLD AUTO: 0.4 % (ref 0–1.5)
BUN BLD-MCNC: 7 MG/DL (ref 6–20)
BUN/CREAT SERPL: 8.9 (ref 7–25)
CALCIUM SPEC-SCNC: 9.1 MG/DL (ref 8.6–10.5)
CHLORIDE SERPL-SCNC: 98 MMOL/L (ref 98–107)
CO2 SERPL-SCNC: 25.9 MMOL/L (ref 22–29)
CREAT BLD-MCNC: 0.79 MG/DL (ref 0.76–1.27)
DEPRECATED RDW RBC AUTO: 39.4 FL (ref 37–54)
EOSINOPHIL # BLD AUTO: 0.23 10*3/MM3 (ref 0–0.4)
EOSINOPHIL NFR BLD AUTO: 1.6 % (ref 0.3–6.2)
ERYTHROCYTE [DISTWIDTH] IN BLOOD BY AUTOMATED COUNT: 13.4 % (ref 12.3–15.4)
GFR SERPL CREATININE-BSD FRML MDRD: 110 ML/MIN/1.73
GLUCOSE BLD-MCNC: 166 MG/DL (ref 65–99)
HBA1C MFR BLD: 7.95 % (ref 4.8–5.6)
HCT VFR BLD AUTO: 42.7 % (ref 37.5–51)
HGB BLD-MCNC: 14.9 G/DL (ref 13–17.7)
IMM GRANULOCYTES # BLD AUTO: 0.13 10*3/MM3 (ref 0–0.05)
IMM GRANULOCYTES NFR BLD AUTO: 0.9 % (ref 0–0.5)
LYMPHOCYTES # BLD AUTO: 4.29 10*3/MM3 (ref 0.7–3.1)
LYMPHOCYTES NFR BLD AUTO: 30.7 % (ref 19.6–45.3)
MCH RBC QN AUTO: 28.5 PG (ref 26.6–33)
MCHC RBC AUTO-ENTMCNC: 34.9 G/DL (ref 31.5–35.7)
MCV RBC AUTO: 81.8 FL (ref 79–97)
MONOCYTES # BLD AUTO: 1.08 10*3/MM3 (ref 0.1–0.9)
MONOCYTES NFR BLD AUTO: 7.7 % (ref 5–12)
NEUTROPHILS # BLD AUTO: 8.18 10*3/MM3 (ref 1.7–7)
NEUTROPHILS NFR BLD AUTO: 58.7 % (ref 42.7–76)
NRBC BLD AUTO-RTO: 0 /100 WBC (ref 0–0.2)
PLATELET # BLD AUTO: 248 10*3/MM3 (ref 140–450)
PMV BLD AUTO: 10.9 FL (ref 6–12)
POTASSIUM BLD-SCNC: 4 MMOL/L (ref 3.5–5.2)
RBC # BLD AUTO: 5.22 10*6/MM3 (ref 4.14–5.8)
SODIUM BLD-SCNC: 136 MMOL/L (ref 136–145)
T4 FREE SERPL-MCNC: 1.95 NG/DL (ref 0.93–1.7)
TSH SERPL DL<=0.05 MIU/L-ACNC: 0.76 UIU/ML (ref 0.27–4.2)
WBC NRBC COR # BLD: 13.97 10*3/MM3 (ref 3.4–10.8)

## 2020-03-20 PROCEDURE — 80048 BASIC METABOLIC PNL TOTAL CA: CPT

## 2020-03-20 PROCEDURE — 84443 ASSAY THYROID STIM HORMONE: CPT

## 2020-03-20 PROCEDURE — 85025 COMPLETE CBC W/AUTO DIFF WBC: CPT

## 2020-03-20 PROCEDURE — 84439 ASSAY OF FREE THYROXINE: CPT

## 2020-03-20 PROCEDURE — 36415 COLL VENOUS BLD VENIPUNCTURE: CPT

## 2020-03-20 PROCEDURE — 83036 HEMOGLOBIN GLYCOSYLATED A1C: CPT

## 2020-11-02 ENCOUNTER — TRANSCRIBE ORDERS (OUTPATIENT)
Dept: ADMINISTRATIVE | Facility: HOSPITAL | Age: 40
End: 2020-11-02

## 2020-11-02 DIAGNOSIS — R10.9 ABDOMINAL PAIN, UNSPECIFIED ABDOMINAL LOCATION: Primary | ICD-10-CM

## 2020-11-06 ENCOUNTER — HOSPITAL ENCOUNTER (OUTPATIENT)
Dept: CT IMAGING | Facility: HOSPITAL | Age: 40
Discharge: HOME | End: 2020-11-06

## 2020-11-06 ENCOUNTER — TRANSCRIBE ORDERS (OUTPATIENT)
Dept: ADMINISTRATIVE | Facility: HOSPITAL | Age: 40
End: 2020-11-06

## 2020-11-06 ENCOUNTER — HOSPITAL ENCOUNTER (OUTPATIENT)
Dept: GENERAL RADIOLOGY | Facility: HOSPITAL | Age: 40
Discharge: HOME | End: 2020-11-06

## 2020-11-06 ENCOUNTER — OFFICE VISIT (OUTPATIENT)
Dept: CARDIOLOGY | Facility: CLINIC | Age: 40
End: 2020-11-06

## 2020-11-06 ENCOUNTER — LAB (OUTPATIENT)
Dept: LAB | Facility: HOSPITAL | Age: 40
End: 2020-11-06

## 2020-11-06 VITALS
SYSTOLIC BLOOD PRESSURE: 127 MMHG | DIASTOLIC BLOOD PRESSURE: 87 MMHG | OXYGEN SATURATION: 98 % | HEART RATE: 84 BPM | RESPIRATION RATE: 14 BRPM | TEMPERATURE: 94.9 F | BODY MASS INDEX: 33.04 KG/M2 | WEIGHT: 205.6 LBS | HEIGHT: 66 IN

## 2020-11-06 DIAGNOSIS — E11.9 TYPE 2 DIABETES MELLITUS WITHOUT COMPLICATION, UNSPECIFIED WHETHER LONG TERM INSULIN USE (HCC): ICD-10-CM

## 2020-11-06 DIAGNOSIS — R05.9 COUGH: ICD-10-CM

## 2020-11-06 DIAGNOSIS — R53.83 OTHER FATIGUE: ICD-10-CM

## 2020-11-06 DIAGNOSIS — R05.9 COUGH: Primary | ICD-10-CM

## 2020-11-06 DIAGNOSIS — R07.2 PRECORDIAL PAIN: Primary | ICD-10-CM

## 2020-11-06 DIAGNOSIS — R07.9 CHEST PAIN, UNSPECIFIED TYPE: ICD-10-CM

## 2020-11-06 DIAGNOSIS — E07.9 DISORDER OF THYROID, UNSPECIFIED: ICD-10-CM

## 2020-11-06 DIAGNOSIS — Z13.220 ENCOUNTER FOR SCREENING FOR LIPOID DISORDERS: ICD-10-CM

## 2020-11-06 DIAGNOSIS — Z00.01 ENCOUNTER FOR GENERAL ADULT MEDICAL EXAMINATION WITH ABNORMAL FINDINGS: ICD-10-CM

## 2020-11-06 DIAGNOSIS — R10.9 ABDOMINAL PAIN, UNSPECIFIED ABDOMINAL LOCATION: ICD-10-CM

## 2020-11-06 PROCEDURE — 85025 COMPLETE CBC W/AUTO DIFF WBC: CPT

## 2020-11-06 PROCEDURE — 80053 COMPREHEN METABOLIC PANEL: CPT

## 2020-11-06 PROCEDURE — 36415 COLL VENOUS BLD VENIPUNCTURE: CPT

## 2020-11-06 PROCEDURE — 84403 ASSAY OF TOTAL TESTOSTERONE: CPT

## 2020-11-06 PROCEDURE — 84439 ASSAY OF FREE THYROXINE: CPT

## 2020-11-06 PROCEDURE — 99213 OFFICE O/P EST LOW 20 MIN: CPT | Performed by: PHYSICIAN ASSISTANT

## 2020-11-06 PROCEDURE — 84402 ASSAY OF FREE TESTOSTERONE: CPT

## 2020-11-06 PROCEDURE — 82043 UR ALBUMIN QUANTITATIVE: CPT

## 2020-11-06 PROCEDURE — 84443 ASSAY THYROID STIM HORMONE: CPT

## 2020-11-06 PROCEDURE — 81003 URINALYSIS AUTO W/O SCOPE: CPT

## 2020-11-06 PROCEDURE — 86140 C-REACTIVE PROTEIN: CPT

## 2020-11-06 PROCEDURE — 83036 HEMOGLOBIN GLYCOSYLATED A1C: CPT

## 2020-11-06 PROCEDURE — 71046 X-RAY EXAM CHEST 2 VIEWS: CPT

## 2020-11-06 PROCEDURE — 71046 X-RAY EXAM CHEST 2 VIEWS: CPT | Performed by: RADIOLOGY

## 2020-11-06 PROCEDURE — 80061 LIPID PANEL: CPT

## 2020-11-06 PROCEDURE — 74176 CT ABD & PELVIS W/O CONTRAST: CPT

## 2020-11-06 PROCEDURE — 83090 ASSAY OF HOMOCYSTEINE: CPT

## 2020-11-06 PROCEDURE — 74176 CT ABD & PELVIS W/O CONTRAST: CPT | Performed by: RADIOLOGY

## 2020-11-06 PROCEDURE — 93000 ELECTROCARDIOGRAM COMPLETE: CPT | Performed by: PHYSICIAN ASSISTANT

## 2020-11-06 NOTE — PROGRESS NOTES
"Stephanie Green APRN  Camilo Harper  1980 11/06/2020    Patient Active Problem List   Diagnosis   • Cough   • Dizziness   • Abnormal EKG   • Essential hypertension   • Type 2 diabetes mellitus without complication, without long-term current use of insulin (CMS/[facility])   • Family history of premature coronary artery disease       Dear Stephanie Green APRN:    Subjective     History of Present Illness:    Chief Complaint   Patient presents with   • Follow-up     has been having some twitching pain in the left armpit area   • Med Management     list       Camilo Harper is a pleasant 40 y.o. male with a past medical history significant for no history of known coronary artery disease or structural heart disease or cardiac arrhythmias, he has a history of tobacco abuse for 20 years.  Has not smoked in over a year, type 2 diabetes mellitus non-insulin-dependent, family history of coronary artery disease with his father having CABG in his 60s and his mother having aortic valve replacement.  Patient comes in for routine cardiology follow-up.    Patient reports that he has still been having some chest pains.  He reports that these pains start roughly in the left lateral chest wall just under his armpit and radiates upward.  He describes these pains as a \"twitch\" he reports that these pains are coming on 4-5 times weekly and will last for few seconds at a time.  He does report that he has been unable to reproduce these pains reliable on physical exertion and that they typically just come on at random with no noticeable pattern.  He does report that raising his arm above his head sometimes does help resolve this pain.  He does deny any associated symptoms of shortness of breath or diaphoresis.  Outside of these episodes of chest pain she does report he has had some shortness of breath however, he does deny any orthopnea, paroxysmal nocturnal dyspnea, or pedal edema.  He does report most of his shortness " "of breath is from exertion.    No Known Allergies:      Current Outpatient Medications:   •  aspirin 81 MG EC tablet, Take 81 mg by mouth Daily., Disp: , Rfl:   •  carvedilol (COREG) 6.25 MG tablet, 6.25 mg 2 (Two) Times a Day With Meals., Disp: , Rfl: 0  •  levothyroxine (SYNTHROID, LEVOTHROID) 175 MCG tablet, 150 mcg Daily., Disp: , Rfl: 0  •  metFORMIN (GLUCOPHAGE) 500 MG tablet, 1,000 mg 2 (Two) Times a Day With Meals., Disp: , Rfl: 0  •  pantoprazole (PROTONIX) 40 MG EC tablet, 40 mg., Disp: , Rfl: 0    The following portions of the patient's history were reviewed and updated as appropriate: allergies, current medications, past family history, past medical history, past social history, past surgical history and problem list.    Social History     Tobacco Use   • Smoking status: Former Smoker     Packs/day: 2.00     Types: Cigarettes     Quit date: 2017     Years since quitting: 3.8   • Smokeless tobacco: Never Used   Substance Use Topics   • Alcohol use: No   • Drug use: No       Review of Systems   Constitution: Positive for malaise/fatigue.   Cardiovascular: Positive for chest pain and dyspnea on exertion. Negative for irregular heartbeat.   Respiratory: Positive for shortness of breath. Negative for cough.    Hematologic/Lymphatic: Negative for bleeding problem. Does not bruise/bleed easily.   Gastrointestinal: Negative for nausea and vomiting.   Neurological: Negative for weakness.       Objective   Vitals:    11/06/20 1109   BP: 127/87   BP Location: Left arm   Patient Position: Sitting   Cuff Size: Large Adult   Pulse: 84   Resp: 14   Temp: 94.9 °F (34.9 °C)   TempSrc: Temporal   SpO2: 98%   Weight: 93.3 kg (205 lb 9.6 oz)   Height: 167.6 cm (66\")     Body mass index is 33.18 kg/m².    Constitutional:       General: Not in acute distress.     Appearance: Healthy appearance. Well-developed and not in distress. Not diaphoretic.   Eyes:      Conjunctiva/sclera: Conjunctivae normal.      Pupils: Pupils are " equal, round, and reactive to light.   HENT:      Head: Normocephalic and atraumatic.   Neck:      Vascular: No carotid bruit or JVD.   Pulmonary:      Effort: Pulmonary effort is normal. No respiratory distress.      Breath sounds: Normal breath sounds.   Cardiovascular:      Normal rate. Regular rhythm.   Skin:     General: Skin is cool.   Neurological:      Mental Status: Alert, oriented to person, place, and time and oriented to person, place and time.         Lab Results   Component Value Date     03/20/2020    K 4.0 03/20/2020    CL 98 03/20/2020    CO2 25.9 03/20/2020    BUN 7 03/20/2020    CREATININE 0.79 03/20/2020    GLUCOSE 166 (H) 03/20/2020    CALCIUM 9.1 03/20/2020    AST 60 (H) 11/01/2018     (H) 11/01/2018    ALKPHOS 79 11/01/2018     No results found for: CKTOTAL  Lab Results   Component Value Date    WBC 13.97 (H) 03/20/2020    HGB 14.9 03/20/2020    HCT 42.7 03/20/2020     03/20/2020     No results found for: INR  No results found for: MG  Lab Results   Component Value Date    TSH 0.763 03/20/2020    PSA 0.24 01/24/2016    TRIG 132 11/01/2018    HDL 40 (L) 11/01/2018    LDL 64 11/01/2018      No results found for: BNP    During this visit the following were done:  Labs Reviewed [x]    Labs Ordered []    Radiology Reports Reviewed [x]    Radiology Ordered []    PCP Records Reviewed []    Referring Provider Records Reviewed []    ER Records Reviewed []    Hospital Records Reviewed []    History Obtained From Family []    Radiology Images Reviewed []    Other Reviewed []    Records Requested []         ECG 12 Lead    Date/Time: 11/6/2020 11:10 AM  Performed by: Sesar Carter PA-C  Authorized by: Sesar Carter PA-C   Comparison: compared with previous ECG   Similar to previous ECG  Rhythm: sinus rhythm  Conduction: conduction normal  Conduction: non-specific intraventricular conduction delay  Q waves: III and aVF    Other findings: poor R wave progression    Clinical  impression: non-specific ECG            Assessment/Plan    Diagnosis Plan   1. Precordial pain  Adult Stress Echo W/ Cont or Stress Agent if Necessary Per Protocol            Recommendations:  1. Precordial pain  1. We will evaluate further with a stress echo.  2. Continue aspirin  2. Tobacco abuse  1. Unfortunately patient did restart smoking tobacco he had previously quit I encouraged him to continue to pursue abstinence.      No follow-ups on file.    As always, I appreciate very much the opportunity to participate in the cardiovascular care of your patients.      With Best Regards,    Sesar Carter PA-C

## 2020-11-07 LAB
ALBUMIN SERPL-MCNC: 4.5 G/DL (ref 3.5–5.2)
ALBUMIN UR-MCNC: <1.2 MG/DL
ALBUMIN/GLOB SERPL: 1.8 G/DL
ALP SERPL-CCNC: 84 U/L (ref 39–117)
ALT SERPL W P-5'-P-CCNC: 38 U/L (ref 1–41)
ANION GAP SERPL CALCULATED.3IONS-SCNC: 8.5 MMOL/L (ref 5–15)
AST SERPL-CCNC: 29 U/L (ref 1–40)
BASOPHILS # BLD AUTO: 0.08 10*3/MM3 (ref 0–0.2)
BASOPHILS NFR BLD AUTO: 0.6 % (ref 0–1.5)
BILIRUB SERPL-MCNC: 0.4 MG/DL (ref 0–1.2)
BILIRUB UR QL STRIP: NEGATIVE
BUN SERPL-MCNC: 6 MG/DL (ref 6–20)
BUN/CREAT SERPL: 9.8 (ref 7–25)
CALCIUM SPEC-SCNC: 9.1 MG/DL (ref 8.6–10.5)
CHLORIDE SERPL-SCNC: 106 MMOL/L (ref 98–107)
CHOLEST SERPL-MCNC: 115 MG/DL (ref 0–200)
CLARITY UR: ABNORMAL
CO2 SERPL-SCNC: 24.5 MMOL/L (ref 22–29)
COLOR UR: YELLOW
CREAT SERPL-MCNC: 0.61 MG/DL (ref 0.76–1.27)
CRP SERPL-MCNC: 0.91 MG/DL (ref 0–0.5)
DEPRECATED RDW RBC AUTO: 38.5 FL (ref 37–54)
EOSINOPHIL # BLD AUTO: 0.18 10*3/MM3 (ref 0–0.4)
EOSINOPHIL NFR BLD AUTO: 1.5 % (ref 0.3–6.2)
ERYTHROCYTE [DISTWIDTH] IN BLOOD BY AUTOMATED COUNT: 13.4 % (ref 12.3–15.4)
GFR SERPL CREATININE-BSD FRML MDRD: 147 ML/MIN/1.73
GLOBULIN UR ELPH-MCNC: 2.5 GM/DL
GLUCOSE SERPL-MCNC: 107 MG/DL (ref 65–99)
GLUCOSE UR STRIP-MCNC: NEGATIVE MG/DL
HBA1C MFR BLD: 7.48 % (ref 4.8–5.6)
HCT VFR BLD AUTO: 45.3 % (ref 37.5–51)
HCYS SERPL-MCNC: 8.8 UMOL/L (ref 0–15)
HDLC SERPL-MCNC: 38 MG/DL (ref 40–60)
HGB BLD-MCNC: 15.5 G/DL (ref 13–17.7)
HGB UR QL STRIP.AUTO: NEGATIVE
IMM GRANULOCYTES # BLD AUTO: 0.11 10*3/MM3 (ref 0–0.05)
IMM GRANULOCYTES NFR BLD AUTO: 0.9 % (ref 0–0.5)
KETONES UR QL STRIP: NEGATIVE
LDLC SERPL CALC-MCNC: 58 MG/DL (ref 0–100)
LDLC/HDLC SERPL: 1.51 {RATIO}
LEUKOCYTE ESTERASE UR QL STRIP.AUTO: NEGATIVE
LYMPHOCYTES # BLD AUTO: 3.78 10*3/MM3 (ref 0.7–3.1)
LYMPHOCYTES NFR BLD AUTO: 30.5 % (ref 19.6–45.3)
MCH RBC QN AUTO: 27.3 PG (ref 26.6–33)
MCHC RBC AUTO-ENTMCNC: 34.2 G/DL (ref 31.5–35.7)
MCV RBC AUTO: 79.9 FL (ref 79–97)
MONOCYTES # BLD AUTO: 0.73 10*3/MM3 (ref 0.1–0.9)
MONOCYTES NFR BLD AUTO: 5.9 % (ref 5–12)
NEUTROPHILS NFR BLD AUTO: 60.6 % (ref 42.7–76)
NEUTROPHILS NFR BLD AUTO: 7.51 10*3/MM3 (ref 1.7–7)
NITRITE UR QL STRIP: NEGATIVE
NRBC BLD AUTO-RTO: 0 /100 WBC (ref 0–0.2)
PH UR STRIP.AUTO: <=5 [PH] (ref 5–8)
PLATELET # BLD AUTO: 235 10*3/MM3 (ref 140–450)
PMV BLD AUTO: 10.9 FL (ref 6–12)
POTASSIUM SERPL-SCNC: 4.1 MMOL/L (ref 3.5–5.2)
PROT SERPL-MCNC: 7 G/DL (ref 6–8.5)
PROT UR QL STRIP: NEGATIVE
RBC # BLD AUTO: 5.67 10*6/MM3 (ref 4.14–5.8)
SODIUM SERPL-SCNC: 139 MMOL/L (ref 136–145)
SP GR UR STRIP: 1.02 (ref 1–1.03)
T4 FREE SERPL-MCNC: 1.67 NG/DL (ref 0.93–1.7)
TRIGL SERPL-MCNC: 99 MG/DL (ref 0–150)
TSH SERPL DL<=0.05 MIU/L-ACNC: 0.52 UIU/ML (ref 0.27–4.2)
UROBILINOGEN UR QL STRIP: ABNORMAL
VLDLC SERPL-MCNC: 19 MG/DL (ref 5–40)
WBC # BLD AUTO: 12.39 10*3/MM3 (ref 3.4–10.8)

## 2020-11-08 LAB
TESTOST FREE SERPL-MCNC: 13.3 PG/ML (ref 8.7–25.1)
TESTOST SERPL-MCNC: 499 NG/DL (ref 264–916)

## 2020-11-18 ENCOUNTER — APPOINTMENT (OUTPATIENT)
Dept: CARDIOLOGY | Facility: HOSPITAL | Age: 40
End: 2020-11-18

## 2020-12-03 ENCOUNTER — HOSPITAL ENCOUNTER (OUTPATIENT)
Dept: CARDIOLOGY | Facility: HOSPITAL | Age: 40
Discharge: HOME | End: 2020-12-03
Admitting: PHYSICIAN ASSISTANT

## 2020-12-03 VITALS — SYSTOLIC BLOOD PRESSURE: 200 MMHG | DIASTOLIC BLOOD PRESSURE: 58 MMHG | HEART RATE: 176 BPM

## 2020-12-03 DIAGNOSIS — R07.2 PRECORDIAL PAIN: ICD-10-CM

## 2020-12-03 PROCEDURE — 25010000002 DOBUTAMINE PER 250 MG: Performed by: PHYSICIAN ASSISTANT

## 2020-12-03 PROCEDURE — 93325 DOPPLER ECHO COLOR FLOW MAPG: CPT | Performed by: INTERNAL MEDICINE

## 2020-12-03 PROCEDURE — 93351 STRESS TTE COMPLETE: CPT

## 2020-12-03 PROCEDURE — 93320 DOPPLER ECHO COMPLETE: CPT | Performed by: INTERNAL MEDICINE

## 2020-12-03 PROCEDURE — 93325 DOPPLER ECHO COLOR FLOW MAPG: CPT

## 2020-12-03 PROCEDURE — 93351 STRESS TTE COMPLETE: CPT | Performed by: INTERNAL MEDICINE

## 2020-12-03 PROCEDURE — 25010000003 ATROPINE SULFATE: Performed by: PHYSICIAN ASSISTANT

## 2020-12-03 PROCEDURE — 93320 DOPPLER ECHO COMPLETE: CPT

## 2020-12-03 RX ORDER — DOBUTAMINE HYDROCHLORIDE 200 MG/100ML
10 INJECTION INTRAVENOUS
Status: COMPLETED | OUTPATIENT
Start: 2020-12-03 | End: 2020-12-03

## 2020-12-03 RX ADMIN — DOBUTAMINE HYDROCHLORIDE 10 MCG/KG/MIN: 200 INJECTION INTRAVENOUS at 14:29

## 2020-12-03 RX ADMIN — ATROPINE SULFATE 0.5 MG: 0.1 INJECTION PARENTERAL at 14:42

## 2020-12-06 LAB
BH CV ECHO MEAS - % IVS THICK: -8 %
BH CV ECHO MEAS - % LVPW THICK: 7.3 %
BH CV ECHO MEAS - ACS: 2.2 CM
BH CV ECHO MEAS - AO MAX PG: 4.5 MMHG
BH CV ECHO MEAS - AO MEAN PG: 2 MMHG
BH CV ECHO MEAS - AO ROOT AREA (BSA CORRECTED): 1.6
BH CV ECHO MEAS - AO ROOT AREA: 8.6 CM^2
BH CV ECHO MEAS - AO ROOT DIAM: 3.3 CM
BH CV ECHO MEAS - AO V2 MAX: 106 CM/SEC
BH CV ECHO MEAS - AO V2 MEAN: 68.5 CM/SEC
BH CV ECHO MEAS - AO V2 VTI: 21.4 CM
BH CV ECHO MEAS - BSA(HAYCOCK): 2.1 M^2
BH CV ECHO MEAS - BSA: 2 M^2
BH CV ECHO MEAS - BZI_BMI: 33.1 KILOGRAMS/M^2
BH CV ECHO MEAS - BZI_METRIC_HEIGHT: 167.6 CM
BH CV ECHO MEAS - BZI_METRIC_WEIGHT: 93 KG
BH CV ECHO MEAS - EDV(CUBED): 65.5 ML
BH CV ECHO MEAS - EDV(MOD-SP4): 62 ML
BH CV ECHO MEAS - EDV(TEICH): 71.3 ML
BH CV ECHO MEAS - EF(CUBED): 65 %
BH CV ECHO MEAS - EF(MOD-SP4): 67.1 %
BH CV ECHO MEAS - EF(TEICH): 57.1 %
BH CV ECHO MEAS - ESV(CUBED): 22.9 ML
BH CV ECHO MEAS - ESV(MOD-SP4): 20.4 ML
BH CV ECHO MEAS - ESV(TEICH): 30.6 ML
BH CV ECHO MEAS - FS: 29.5 %
BH CV ECHO MEAS - IVS/LVPW: 0.95
BH CV ECHO MEAS - IVSD: 1.1 CM
BH CV ECHO MEAS - IVSS: 0.97 CM
BH CV ECHO MEAS - LA DIMENSION: 2.8 CM
BH CV ECHO MEAS - LA/AO: 0.85
BH CV ECHO MEAS - LV DIASTOLIC VOL/BSA (35-75): 30.7 ML/M^2
BH CV ECHO MEAS - LV MASS(C)D: 142.5 GRAMS
BH CV ECHO MEAS - LV MASS(C)DI: 70.5 GRAMS/M^2
BH CV ECHO MEAS - LV MASS(C)S: 84.7 GRAMS
BH CV ECHO MEAS - LV MASS(C)SI: 41.9 GRAMS/M^2
BH CV ECHO MEAS - LV SYSTOLIC VOL/BSA (12-30): 10.1 ML/M^2
BH CV ECHO MEAS - LVIDD: 4 CM
BH CV ECHO MEAS - LVIDS: 2.8 CM
BH CV ECHO MEAS - LVLD AP4: 5.8 CM
BH CV ECHO MEAS - LVLS AP4: 4.7 CM
BH CV ECHO MEAS - LVOT AREA (M): 3.1 CM^2
BH CV ECHO MEAS - LVOT AREA: 3.1 CM^2
BH CV ECHO MEAS - LVOT DIAM: 2 CM
BH CV ECHO MEAS - LVPWD: 1.1 CM
BH CV ECHO MEAS - LVPWS: 1.2 CM
BH CV ECHO MEAS - MV A MAX VEL: 63.4 CM/SEC
BH CV ECHO MEAS - MV E MAX VEL: 72.4 CM/SEC
BH CV ECHO MEAS - MV E/A: 1.1
BH CV ECHO MEAS - PA ACC TIME: 0.11 SEC
BH CV ECHO MEAS - PA PR(ACCEL): 28.2 MMHG
BH CV ECHO MEAS - SI(AO): 90.6 ML/M^2
BH CV ECHO MEAS - SI(CUBED): 21 ML/M^2
BH CV ECHO MEAS - SI(MOD-SP4): 20.6 ML/M^2
BH CV ECHO MEAS - SI(TEICH): 20.1 ML/M^2
BH CV ECHO MEAS - SV(AO): 183 ML
BH CV ECHO MEAS - SV(CUBED): 42.5 ML
BH CV ECHO MEAS - SV(MOD-SP4): 41.6 ML
BH CV ECHO MEAS - SV(TEICH): 40.7 ML
BH CV STRESS BP STAGE 1: NORMAL
BH CV STRESS BP STAGE 2: NORMAL
BH CV STRESS BP STAGE 3: NORMAL
BH CV STRESS DOSE DOBUTAMINE STAGE 1: 10
BH CV STRESS DURATION MIN STAGE 1: 3
BH CV STRESS DURATION MIN STAGE 2: 3
BH CV STRESS DURATION MIN STAGE 3: 1
BH CV STRESS DURATION SEC STAGE 1: 0
BH CV STRESS DURATION SEC STAGE 2: 0
BH CV STRESS DURATION SEC STAGE 3: 39
BH CV STRESS ECHO POST STRESS EJECTION FRACTION EF: 70 %
BH CV STRESS GRADE STAGE 1: 10
BH CV STRESS GRADE STAGE 2: 12
BH CV STRESS GRADE STAGE 3: 14
BH CV STRESS HR STAGE 1: 114
BH CV STRESS HR STAGE 2: 124
BH CV STRESS HR STAGE 3: 138
BH CV STRESS METS STAGE 1: 5
BH CV STRESS METS STAGE 2: 7.5
BH CV STRESS METS STAGE 3: 10
BH CV STRESS PROTOCOL 1: NORMAL
BH CV STRESS PROTOCOL 2 BP STAGE 1: NORMAL
BH CV STRESS PROTOCOL 2 BP STAGE 2: NORMAL
BH CV STRESS PROTOCOL 2 BP STAGE 3: 96
BH CV STRESS PROTOCOL 2 BP STAGE 4: 96
BH CV STRESS PROTOCOL 2 DOSE DOBUTAMINE STAGE 1: 10
BH CV STRESS PROTOCOL 2 DOSE DOBUTAMINE STAGE 2: 20
BH CV STRESS PROTOCOL 2 DOSE DOBUTAMINE STAGE 3: 30
BH CV STRESS PROTOCOL 2 DOSE DOBUTAMINE STAGE 4: 40
BH CV STRESS PROTOCOL 2 DURATION MIN STAGE 1: 2
BH CV STRESS PROTOCOL 2 DURATION MIN STAGE 2: 3
BH CV STRESS PROTOCOL 2 DURATION MIN STAGE 3: 3
BH CV STRESS PROTOCOL 2 DURATION MIN STAGE 4: 2
BH CV STRESS PROTOCOL 2 DURATION SEC STAGE 1: 0
BH CV STRESS PROTOCOL 2 DURATION SEC STAGE 2: 0
BH CV STRESS PROTOCOL 2 DURATION SEC STAGE 3: 0
BH CV STRESS PROTOCOL 2 DURATION SEC STAGE 4: 0
BH CV STRESS PROTOCOL 2 HR STAGE 1: 86
BH CV STRESS PROTOCOL 2 HR STAGE 2: 88
BH CV STRESS PROTOCOL 2 HR STAGE 3: NORMAL
BH CV STRESS PROTOCOL 2 HR STAGE 4: NORMAL
BH CV STRESS PROTOCOL 2 STAGE 1: 1
BH CV STRESS PROTOCOL 2 STAGE 2: 2
BH CV STRESS PROTOCOL 2 STAGE 3: 3
BH CV STRESS PROTOCOL 2 STAGE 4: 4
BH CV STRESS PROTOCOL 2: NORMAL
BH CV STRESS RECOVERY BP: NORMAL MMHG
BH CV STRESS RECOVERY HR: 99 BPM
BH CV STRESS SPEED STAGE 1: 1.7
BH CV STRESS SPEED STAGE 2: 2.5
BH CV STRESS SPEED STAGE 3: 3.4
BH CV STRESS STAGE 1: 1
BH CV STRESS STAGE 2: 2
BH CV STRESS STAGE 3: 3
MAXIMAL PREDICTED HEART RATE: 181 BPM
PERCENT MAX PREDICTED HR: 97.24 %
STRESS BASELINE BP: NORMAL MMHG
STRESS BASELINE HR: 85 BPM
STRESS PERCENT HR: 114 %
STRESS POST EXERCISE DUR MIN: 7 MIN
STRESS POST EXERCISE DUR SEC: 39 SEC
STRESS POST PEAK BP: NORMAL MMHG
STRESS POST PEAK HR: 176 BPM
STRESS TARGET HR: 154 BPM

## 2020-12-09 ENCOUNTER — OFFICE VISIT (OUTPATIENT)
Dept: CARDIOLOGY | Facility: CLINIC | Age: 40
End: 2020-12-09

## 2020-12-09 ENCOUNTER — TRANSCRIBE ORDERS (OUTPATIENT)
Dept: ADMINISTRATIVE | Facility: HOSPITAL | Age: 40
End: 2020-12-09

## 2020-12-09 ENCOUNTER — HOSPITAL ENCOUNTER (OUTPATIENT)
Dept: GENERAL RADIOLOGY | Facility: HOSPITAL | Age: 40
Discharge: HOME | End: 2020-12-09
Admitting: PHYSICIAN ASSISTANT

## 2020-12-09 VITALS
TEMPERATURE: 96.2 F | SYSTOLIC BLOOD PRESSURE: 115 MMHG | HEIGHT: 66 IN | WEIGHT: 201 LBS | RESPIRATION RATE: 16 BRPM | DIASTOLIC BLOOD PRESSURE: 77 MMHG | BODY MASS INDEX: 32.3 KG/M2 | HEART RATE: 89 BPM

## 2020-12-09 DIAGNOSIS — I10 ESSENTIAL HYPERTENSION: ICD-10-CM

## 2020-12-09 DIAGNOSIS — M25.561 RIGHT KNEE PAIN, UNSPECIFIED CHRONICITY: ICD-10-CM

## 2020-12-09 DIAGNOSIS — E11.9 TYPE 2 DIABETES MELLITUS WITHOUT COMPLICATION, WITHOUT LONG-TERM CURRENT USE OF INSULIN (HCC): ICD-10-CM

## 2020-12-09 DIAGNOSIS — M25.562 LEFT KNEE PAIN, UNSPECIFIED CHRONICITY: ICD-10-CM

## 2020-12-09 DIAGNOSIS — R07.2 PRECORDIAL PAIN: Primary | ICD-10-CM

## 2020-12-09 DIAGNOSIS — M25.562 LEFT KNEE PAIN, UNSPECIFIED CHRONICITY: Primary | ICD-10-CM

## 2020-12-09 PROCEDURE — 73562 X-RAY EXAM OF KNEE 3: CPT | Performed by: RADIOLOGY

## 2020-12-09 PROCEDURE — 73562 X-RAY EXAM OF KNEE 3: CPT

## 2020-12-09 PROCEDURE — 99213 OFFICE O/P EST LOW 20 MIN: CPT | Performed by: PHYSICIAN ASSISTANT

## 2020-12-09 NOTE — PROGRESS NOTES
Stephanie Green APRN  Camilo Harper  1980 12/09/2020    Patient Active Problem List   Diagnosis   • Cough   • Dizziness   • Abnormal EKG   • Essential hypertension   • Type 2 diabetes mellitus without complication, without long-term current use of insulin (CMS/[facility])   • Family history of premature coronary artery disease   • Precordial pain       Dear Stephanie Green APRN:    Subjective     History of Present Illness:    Chief Complaint   Patient presents with   • Results     stress echo findings   • Shortness of Breath     routine activity   • Med Management     verbal       Camilo Harper is a pleasant 40 y.o. male with a past medical history significant for no history of known coronary artery disease or structural heart disease or cardiac arrhythmias, he does smoke tobacco for 20 years and is type 2 diabetes mellitus non-insulin-dependent, he also has family history of coronary artery disease with his father having CABG in his 60s and his mother having aortic valve replacement.  Patient comes in for routine cardiology follow-up.    Patient stress echo overall was unremarkable no signs of ischemia were seen echo portion showed normal left ventricular ejection fraction with no significant valvular abnormalities.  Clinically patient reports he has improved in his symptoms have nearly resolved.  He reports the only time he gets any kind of chest pain pain is with moderate to significant exertion such as climbing 5 flights of stairs at work will bring on some mild pressure just underneath his left armpit.  Otherwise he denies any other episodes of chest pains, shortness of breath, palpitations, dizziness, or syncope.    No Known Allergies:      Current Outpatient Medications:   •  aspirin 81 MG EC tablet, Take 81 mg by mouth Daily., Disp: , Rfl:   •  carvedilol (COREG) 6.25 MG tablet, 6.25 mg 2 (Two) Times a Day With Meals., Disp: , Rfl: 0  •  Empagliflozin (JARDIANCE PO), Take  by mouth  "Daily., Disp: , Rfl:   •  levothyroxine (SYNTHROID, LEVOTHROID) 175 MCG tablet, 150 mcg Daily., Disp: , Rfl: 0  •  metFORMIN (GLUCOPHAGE) 500 MG tablet, 1,000 mg 2 (Two) Times a Day With Meals., Disp: , Rfl: 0  •  pantoprazole (PROTONIX) 40 MG EC tablet, 40 mg Daily., Disp: , Rfl: 0    The following portions of the patient's history were reviewed and updated as appropriate: allergies, current medications, past family history, past medical history, past social history, past surgical history and problem list.    Social History     Tobacco Use   • Smoking status: Former Smoker     Packs/day: 2.00     Types: Cigarettes     Quit date: 2017     Years since quitting: 3.9   • Smokeless tobacco: Never Used   Substance Use Topics   • Alcohol use: No   • Drug use: No       Review of Systems   Constitution: Negative for malaise/fatigue.   Cardiovascular: Negative for chest pain, dyspnea on exertion, irregular heartbeat, leg swelling and palpitations.   Respiratory: Positive for shortness of breath. Negative for cough.    Hematologic/Lymphatic: Negative for bleeding problem. Does not bruise/bleed easily.   Gastrointestinal: Negative for nausea and vomiting.   Neurological: Negative for weakness.       Objective   Vitals:    12/09/20 0922   BP: 115/77   Pulse: 89   Resp: 16   Temp: 96.2 °F (35.7 °C)   Weight: 91.2 kg (201 lb)   Height: 167.6 cm (66\")     Body mass index is 32.44 kg/m².    Constitutional:       General: Not in acute distress.     Appearance: Healthy appearance. Well-developed and not in distress. Not diaphoretic.   Eyes:      Conjunctiva/sclera: Conjunctivae normal.      Pupils: Pupils are equal, round, and reactive to light.   HENT:      Head: Normocephalic and atraumatic.   Neck:      Vascular: No carotid bruit or JVD.   Pulmonary:      Effort: Pulmonary effort is normal. No respiratory distress.      Breath sounds: Normal breath sounds.   Cardiovascular:      Normal rate. Regular rhythm.   Skin:     General: " Skin is cool.   Neurological:      Mental Status: Alert, oriented to person, place, and time and oriented to person, place and time.         Lab Results   Component Value Date     11/06/2020    K 4.1 11/06/2020     11/06/2020    CO2 24.5 11/06/2020    BUN 6 11/06/2020    CREATININE 0.61 (L) 11/06/2020    GLUCOSE 107 (H) 11/06/2020    CALCIUM 9.1 11/06/2020    AST 29 11/06/2020    ALT 38 11/06/2020    ALKPHOS 84 11/06/2020     No results found for: CKTOTAL  Lab Results   Component Value Date    WBC 12.39 (H) 11/06/2020    HGB 15.5 11/06/2020    HCT 45.3 11/06/2020     11/06/2020     No results found for: INR  No results found for: MG  Lab Results   Component Value Date    TSH 0.515 11/06/2020    PSA 0.24 01/24/2016    TRIG 99 11/06/2020    HDL 38 (L) 11/06/2020    LDL 58 11/06/2020      No results found for: BNP    During this visit the following were done:  Labs Reviewed [x]    Labs Ordered []    Radiology Reports Reviewed [x]    Radiology Ordered []    PCP Records Reviewed []    Referring Provider Records Reviewed []    ER Records Reviewed []    Hospital Records Reviewed []    History Obtained From Family []    Radiology Images Reviewed []    Other Reviewed []    Records Requested []       Procedures    Assessment/Plan    Diagnosis Plan   1. Precordial pain     2. Type 2 diabetes mellitus without complication, without long-term current use of insulin (CMS/[facility])     3. Essential hypertension              Recommendations:  1. Precordial pain  1. I discussed results of stress echo which was unremarkable.  2. He is having some mild symptoms still I did offer to adjust medical therapy however he is happy with his current medical therapy and and declined adjustment which I do find is reasonable given normal stress echo.  2. Tobacco abuse  1. Unfortunately patient did restart smoking tobacco I encouraged him to continue trying to stop.  He does report he is going to start the first of the year.    No  follow-ups on file.    As always, I appreciate very much the opportunity to participate in the cardiovascular care of your patients.      With Best Regards,    Sesar Carter PA-C

## 2021-10-22 ENCOUNTER — HOSPITAL ENCOUNTER (EMERGENCY)
Facility: HOSPITAL | Age: 41
Discharge: HOME | End: 2021-10-22
Attending: STUDENT IN AN ORGANIZED HEALTH CARE EDUCATION/TRAINING PROGRAM | Admitting: STUDENT IN AN ORGANIZED HEALTH CARE EDUCATION/TRAINING PROGRAM

## 2021-10-22 ENCOUNTER — APPOINTMENT (OUTPATIENT)
Dept: CT IMAGING | Facility: HOSPITAL | Age: 41
End: 2021-10-22

## 2021-10-22 VITALS
OXYGEN SATURATION: 98 % | RESPIRATION RATE: 16 BRPM | BODY MASS INDEX: 32.14 KG/M2 | TEMPERATURE: 96.8 F | WEIGHT: 200 LBS | HEART RATE: 78 BPM | DIASTOLIC BLOOD PRESSURE: 90 MMHG | HEIGHT: 66 IN | SYSTOLIC BLOOD PRESSURE: 128 MMHG

## 2021-10-22 DIAGNOSIS — K52.9 COLITIS: Primary | ICD-10-CM

## 2021-10-22 LAB
ALBUMIN SERPL-MCNC: 4.77 G/DL (ref 3.5–5.2)
ALBUMIN/GLOB SERPL: 1.9 G/DL
ALP SERPL-CCNC: 89 U/L (ref 39–117)
ALT SERPL W P-5'-P-CCNC: 19 U/L (ref 1–41)
ANION GAP SERPL CALCULATED.3IONS-SCNC: 9.6 MMOL/L (ref 5–15)
AST SERPL-CCNC: 17 U/L (ref 1–40)
BASOPHILS # BLD AUTO: 0.05 10*3/MM3 (ref 0–0.2)
BASOPHILS NFR BLD AUTO: 0.4 % (ref 0–1.5)
BILIRUB SERPL-MCNC: 0.4 MG/DL (ref 0–1.2)
BILIRUB UR QL STRIP: NEGATIVE
BUN SERPL-MCNC: 10 MG/DL (ref 6–20)
BUN/CREAT SERPL: 12 (ref 7–25)
CALCIUM SPEC-SCNC: 9.2 MG/DL (ref 8.6–10.5)
CHLORIDE SERPL-SCNC: 105 MMOL/L (ref 98–107)
CLARITY UR: CLEAR
CO2 SERPL-SCNC: 25.4 MMOL/L (ref 22–29)
COLOR UR: YELLOW
CREAT SERPL-MCNC: 0.83 MG/DL (ref 0.76–1.27)
DEPRECATED RDW RBC AUTO: 40 FL (ref 37–54)
EOSINOPHIL # BLD AUTO: 0.14 10*3/MM3 (ref 0–0.4)
EOSINOPHIL NFR BLD AUTO: 1 % (ref 0.3–6.2)
ERYTHROCYTE [DISTWIDTH] IN BLOOD BY AUTOMATED COUNT: 14.2 % (ref 12.3–15.4)
GFR SERPL CREATININE-BSD FRML MDRD: 103 ML/MIN/1.73
GLOBULIN UR ELPH-MCNC: 2.5 GM/DL
GLUCOSE SERPL-MCNC: 125 MG/DL (ref 65–99)
GLUCOSE UR STRIP-MCNC: ABNORMAL MG/DL
HCT VFR BLD AUTO: 46.8 % (ref 37.5–51)
HGB BLD-MCNC: 15.1 G/DL (ref 13–17.7)
HGB UR QL STRIP.AUTO: NEGATIVE
HOLD SPECIMEN: NORMAL
IMM GRANULOCYTES # BLD AUTO: 0.12 10*3/MM3 (ref 0–0.05)
IMM GRANULOCYTES NFR BLD AUTO: 0.9 % (ref 0–0.5)
KETONES UR QL STRIP: NEGATIVE
LEUKOCYTE ESTERASE UR QL STRIP.AUTO: NEGATIVE
LIPASE SERPL-CCNC: 48 U/L (ref 13–60)
LYMPHOCYTES # BLD AUTO: 2.51 10*3/MM3 (ref 0.7–3.1)
LYMPHOCYTES NFR BLD AUTO: 18.5 % (ref 19.6–45.3)
MCH RBC QN AUTO: 25.3 PG (ref 26.6–33)
MCHC RBC AUTO-ENTMCNC: 32.3 G/DL (ref 31.5–35.7)
MCV RBC AUTO: 78.5 FL (ref 79–97)
MONOCYTES # BLD AUTO: 0.86 10*3/MM3 (ref 0.1–0.9)
MONOCYTES NFR BLD AUTO: 6.3 % (ref 5–12)
NEUTROPHILS NFR BLD AUTO: 72.9 % (ref 42.7–76)
NEUTROPHILS NFR BLD AUTO: 9.89 10*3/MM3 (ref 1.7–7)
NITRITE UR QL STRIP: NEGATIVE
NRBC BLD AUTO-RTO: 0 /100 WBC (ref 0–0.2)
PH UR STRIP.AUTO: 6 [PH] (ref 5–8)
PLATELET # BLD AUTO: 231 10*3/MM3 (ref 140–450)
PMV BLD AUTO: 9.9 FL (ref 6–12)
POTASSIUM SERPL-SCNC: 4 MMOL/L (ref 3.5–5.2)
PROT SERPL-MCNC: 7.3 G/DL (ref 6–8.5)
PROT UR QL STRIP: NEGATIVE
RBC # BLD AUTO: 5.96 10*6/MM3 (ref 4.14–5.8)
SODIUM SERPL-SCNC: 140 MMOL/L (ref 136–145)
SP GR UR STRIP: >1.03 (ref 1–1.03)
UROBILINOGEN UR QL STRIP: ABNORMAL
WBC # BLD AUTO: 13.57 10*3/MM3 (ref 3.4–10.8)
WHOLE BLOOD HOLD SPECIMEN: NORMAL

## 2021-10-22 PROCEDURE — 99283 EMERGENCY DEPT VISIT LOW MDM: CPT

## 2021-10-22 PROCEDURE — 74177 CT ABD & PELVIS W/CONTRAST: CPT

## 2021-10-22 PROCEDURE — 96375 TX/PRO/DX INJ NEW DRUG ADDON: CPT

## 2021-10-22 PROCEDURE — 83690 ASSAY OF LIPASE: CPT | Performed by: STUDENT IN AN ORGANIZED HEALTH CARE EDUCATION/TRAINING PROGRAM

## 2021-10-22 PROCEDURE — 25010000002 KETOROLAC TROMETHAMINE PER 15 MG: Performed by: STUDENT IN AN ORGANIZED HEALTH CARE EDUCATION/TRAINING PROGRAM

## 2021-10-22 PROCEDURE — 96365 THER/PROPH/DIAG IV INF INIT: CPT

## 2021-10-22 PROCEDURE — 74177 CT ABD & PELVIS W/CONTRAST: CPT | Performed by: RADIOLOGY

## 2021-10-22 PROCEDURE — 25010000002 IOPAMIDOL 61 % SOLUTION: Performed by: STUDENT IN AN ORGANIZED HEALTH CARE EDUCATION/TRAINING PROGRAM

## 2021-10-22 PROCEDURE — 80053 COMPREHEN METABOLIC PANEL: CPT | Performed by: STUDENT IN AN ORGANIZED HEALTH CARE EDUCATION/TRAINING PROGRAM

## 2021-10-22 PROCEDURE — 81003 URINALYSIS AUTO W/O SCOPE: CPT | Performed by: STUDENT IN AN ORGANIZED HEALTH CARE EDUCATION/TRAINING PROGRAM

## 2021-10-22 PROCEDURE — 85025 COMPLETE CBC W/AUTO DIFF WBC: CPT | Performed by: STUDENT IN AN ORGANIZED HEALTH CARE EDUCATION/TRAINING PROGRAM

## 2021-10-22 RX ORDER — CIPROFLOXACIN 500 MG/1
500 TABLET, FILM COATED ORAL 2 TIMES DAILY
Qty: 19 TABLET | Refills: 0 | Status: SHIPPED | OUTPATIENT
Start: 2021-10-22 | End: 2022-11-23

## 2021-10-22 RX ORDER — SODIUM CHLORIDE 0.9 % (FLUSH) 0.9 %
10 SYRINGE (ML) INJECTION AS NEEDED
Status: DISCONTINUED | OUTPATIENT
Start: 2021-10-22 | End: 2021-10-22 | Stop reason: HOSPADM

## 2021-10-22 RX ORDER — CIPROFLOXACIN 500 MG/1
500 TABLET, FILM COATED ORAL ONCE
Status: COMPLETED | OUTPATIENT
Start: 2021-10-22 | End: 2021-10-22

## 2021-10-22 RX ORDER — KETOROLAC TROMETHAMINE 30 MG/ML
30 INJECTION, SOLUTION INTRAMUSCULAR; INTRAVENOUS EVERY 6 HOURS PRN
Status: DISCONTINUED | OUTPATIENT
Start: 2021-10-22 | End: 2021-10-22 | Stop reason: HOSPADM

## 2021-10-22 RX ORDER — METRONIDAZOLE 500 MG/1
500 TABLET ORAL 3 TIMES DAILY
Qty: 20 TABLET | Refills: 0 | Status: SHIPPED | OUTPATIENT
Start: 2021-10-22 | End: 2022-11-23

## 2021-10-22 RX ADMIN — IOPAMIDOL 85 ML: 612 INJECTION, SOLUTION INTRAVENOUS at 10:43

## 2021-10-22 RX ADMIN — CIPROFLOXACIN 500 MG: 500 TABLET, FILM COATED ORAL at 11:34

## 2021-10-22 RX ADMIN — KETOROLAC TROMETHAMINE 30 MG: 30 INJECTION, SOLUTION INTRAMUSCULAR; INTRAVENOUS at 10:17

## 2021-10-22 RX ADMIN — SODIUM CHLORIDE 1000 ML: 9 INJECTION, SOLUTION INTRAVENOUS at 11:34

## 2021-10-22 RX ADMIN — METRONIDAZOLE 500 MG: 500 INJECTION, SOLUTION INTRAVENOUS at 11:34

## 2021-10-22 NOTE — ED PROVIDER NOTES
Subjective   41-year-old male with past medical history of diverticulosis and diabetes presents to the ER with primary complaint of abdominal pain with radiation into both flanks.  Patient also noted concerns for multiple episodes of diarrhea that progressed from brown-colored bowel movements to darker red color bowel movements.  Patient denied significant nausea or vomiting.  Denied hematic emesis.  Denied fever chills.  Denied chest pain or shortness of breath.  Denied obvious aggravating or alleviating factors.  Stable          Review of Systems   Gastrointestinal: Positive for abdominal pain, blood in stool and diarrhea.   All other systems reviewed and are negative.      Past Medical History:   Diagnosis Date   • Diabetes mellitus (CMS/HCC)    • Hypertension    • Thyroid disease        No Known Allergies    Past Surgical History:   Procedure Laterality Date   • VASECTOMY         Family History   Problem Relation Age of Onset   • Hypertension Father    • Heart disease Father    • Hypertension Mother    • Heart disease Mother    • Urolithiasis Maternal Uncle        Social History     Socioeconomic History   • Marital status: Single   Tobacco Use   • Smoking status: Former Smoker     Packs/day: 2.00     Types: Cigarettes     Quit date: 2017     Years since quittin.8   • Smokeless tobacco: Never Used   Substance and Sexual Activity   • Alcohol use: No   • Drug use: No   • Sexual activity: Defer           Objective   Physical Exam  Constitutional:       General: He is not in acute distress.     Appearance: He is well-developed. He is not ill-appearing.   HENT:      Head: Normocephalic and atraumatic.   Eyes:      Extraocular Movements: Extraocular movements intact.      Pupils: Pupils are equal, round, and reactive to light.   Neck:      Vascular: No JVD.   Cardiovascular:      Rate and Rhythm: Normal rate and regular rhythm.      Heart sounds: Normal heart sounds. No murmur heard.      Pulmonary:      Effort:  No tachypnea, accessory muscle usage or respiratory distress.      Breath sounds: Normal breath sounds. No stridor. No decreased breath sounds, wheezing, rhonchi or rales.   Chest:      Chest wall: No deformity, tenderness or crepitus.   Abdominal:      General: Bowel sounds are normal.      Palpations: Abdomen is soft.      Tenderness: There is abdominal tenderness in the right lower quadrant, periumbilical area and left lower quadrant. There is no guarding or rebound.   Musculoskeletal:         General: Normal range of motion.      Cervical back: Normal range of motion and neck supple.      Right lower leg: No tenderness. No edema.      Left lower leg: No tenderness. No edema.   Lymphadenopathy:      Cervical: No cervical adenopathy.   Skin:     General: Skin is warm and dry.      Coloration: Skin is not cyanotic.      Findings: No ecchymosis or erythema.   Neurological:      General: No focal deficit present.      Mental Status: He is alert and oriented to person, place, and time.      Cranial Nerves: No cranial nerve deficit.      Motor: No weakness.   Psychiatric:         Mood and Affect: Mood normal. Mood is not anxious.         Behavior: Behavior normal. Behavior is not agitated.         Procedures           ED Course  ED Course as of 10/22/21 1332   Fri Oct 22, 2021   1330 CBC notes slight leukocytosis.  CCP unremarkable.  Lipase within normal limits.  Urinalysis unremarkable other than elevated glucose.  CT the abdomen pelvis noted concerns for left-sided colitis.  Patient was given IV Flagyl and p.o. Cipro.  Patient will continue Cipro and Flagyl for the next 10 days.  Patient received Toradol for pain.  Work up and results were discussed throughly with the patient.  The patient will be discharged for further monitoring and management with their PCP.  Red flags, warning signs, worsening symptoms, and when to return to the ER discussed with and understood by the patient.  Patient will follow up with their  PCP in a timely manner.  Vitals stable at discharge. [SF]      ED Course User Index  [SF] Dimitrios Winter DO                                           MDM    Final diagnoses:   Colitis       ED Disposition  ED Disposition     ED Disposition Condition Comment    Discharge Stable           No follow-up provider specified.       Medication List      New Prescriptions    ciprofloxacin 500 MG tablet  Commonly known as: CIPRO  Take 1 tablet by mouth 2 (Two) Times a Day.     metroNIDAZOLE 500 MG tablet  Commonly known as: FLAGYL  Take 1 tablet by mouth 3 (Three) Times a Day.           Where to Get Your Medications      These medications were sent to [id number] - [place], [place] - [address] AT SEC OF [facility] [phone] [facility] [phone]   [address] [place] [id number]    Phone: [phone]   · ciprofloxacin 500 MG tablet  · metroNIDAZOLE 500 MG tablet          Dimitrios Winter DO  10/22/21 0356

## 2021-12-24 ENCOUNTER — LAB (OUTPATIENT)
Dept: LAB | Facility: HOSPITAL | Age: 41
End: 2021-12-24

## 2021-12-24 ENCOUNTER — TRANSCRIBE ORDERS (OUTPATIENT)
Dept: ADMINISTRATIVE | Facility: HOSPITAL | Age: 41
End: 2021-12-24

## 2021-12-24 DIAGNOSIS — E11.9 DIABETES MELLITUS WITHOUT COMPLICATION (HCC): ICD-10-CM

## 2021-12-24 DIAGNOSIS — E07.9 DISEASE OF THYROID GLAND: ICD-10-CM

## 2021-12-24 DIAGNOSIS — Z13.220 SCREENING FOR LIPOID DISORDERS: ICD-10-CM

## 2021-12-24 DIAGNOSIS — Z00.01 ENCOUNTER FOR GENERAL ADULT MEDICAL EXAMINATION WITH ABNORMAL FINDINGS: Primary | ICD-10-CM

## 2021-12-24 DIAGNOSIS — Z00.01 ENCOUNTER FOR GENERAL ADULT MEDICAL EXAMINATION WITH ABNORMAL FINDINGS: ICD-10-CM

## 2021-12-25 ENCOUNTER — LAB (OUTPATIENT)
Dept: LAB | Facility: HOSPITAL | Age: 41
End: 2021-12-25

## 2021-12-25 LAB
ALBUMIN SERPL-MCNC: 4.5 G/DL (ref 3.5–5.2)
ALBUMIN/GLOB SERPL: 1.8 G/DL
ALP SERPL-CCNC: 89 U/L (ref 39–117)
ALT SERPL W P-5'-P-CCNC: 24 U/L (ref 1–41)
ANION GAP SERPL CALCULATED.3IONS-SCNC: 14.9 MMOL/L (ref 5–15)
AST SERPL-CCNC: 20 U/L (ref 1–40)
BASOPHILS # BLD AUTO: 0.07 10*3/MM3 (ref 0–0.2)
BASOPHILS NFR BLD AUTO: 0.6 % (ref 0–1.5)
BILIRUB SERPL-MCNC: 0.5 MG/DL (ref 0–1.2)
BUN SERPL-MCNC: 9 MG/DL (ref 6–20)
BUN/CREAT SERPL: 11 (ref 7–25)
CALCIUM SPEC-SCNC: 9.2 MG/DL (ref 8.6–10.5)
CHLORIDE SERPL-SCNC: 103 MMOL/L (ref 98–107)
CHOLEST SERPL-MCNC: 134 MG/DL (ref 0–200)
CO2 SERPL-SCNC: 20.1 MMOL/L (ref 22–29)
CREAT SERPL-MCNC: 0.82 MG/DL (ref 0.76–1.27)
DEPRECATED RDW RBC AUTO: 40 FL (ref 37–54)
EOSINOPHIL # BLD AUTO: 0.22 10*3/MM3 (ref 0–0.4)
EOSINOPHIL NFR BLD AUTO: 1.8 % (ref 0.3–6.2)
ERYTHROCYTE [DISTWIDTH] IN BLOOD BY AUTOMATED COUNT: 14.6 % (ref 12.3–15.4)
GFR SERPL CREATININE-BSD FRML MDRD: 104 ML/MIN/1.73
GLOBULIN UR ELPH-MCNC: 2.5 GM/DL
GLUCOSE SERPL-MCNC: 139 MG/DL (ref 65–99)
HBA1C MFR BLD: 6.7 % (ref 4.8–5.6)
HCT VFR BLD AUTO: 48.8 % (ref 37.5–51)
HDLC SERPL-MCNC: 45 MG/DL (ref 40–60)
HGB BLD-MCNC: 15.8 G/DL (ref 13–17.7)
IMM GRANULOCYTES # BLD AUTO: 0.11 10*3/MM3 (ref 0–0.05)
IMM GRANULOCYTES NFR BLD AUTO: 0.9 % (ref 0–0.5)
LDLC SERPL CALC-MCNC: 69 MG/DL (ref 0–100)
LDLC/HDLC SERPL: 1.48 {RATIO}
LYMPHOCYTES # BLD AUTO: 3.47 10*3/MM3 (ref 0.7–3.1)
LYMPHOCYTES NFR BLD AUTO: 28.7 % (ref 19.6–45.3)
MCH RBC QN AUTO: 25.3 PG (ref 26.6–33)
MCHC RBC AUTO-ENTMCNC: 32.4 G/DL (ref 31.5–35.7)
MCV RBC AUTO: 78.2 FL (ref 79–97)
MONOCYTES # BLD AUTO: 0.81 10*3/MM3 (ref 0.1–0.9)
MONOCYTES NFR BLD AUTO: 6.7 % (ref 5–12)
NEUTROPHILS NFR BLD AUTO: 61.3 % (ref 42.7–76)
NEUTROPHILS NFR BLD AUTO: 7.41 10*3/MM3 (ref 1.7–7)
NRBC BLD AUTO-RTO: 0 /100 WBC (ref 0–0.2)
PLATELET # BLD AUTO: 235 10*3/MM3 (ref 140–450)
PMV BLD AUTO: 9.8 FL (ref 6–12)
POTASSIUM SERPL-SCNC: 4 MMOL/L (ref 3.5–5.2)
PROT SERPL-MCNC: 7 G/DL (ref 6–8.5)
RBC # BLD AUTO: 6.24 10*6/MM3 (ref 4.14–5.8)
SODIUM SERPL-SCNC: 138 MMOL/L (ref 136–145)
T4 FREE SERPL-MCNC: 1.57 NG/DL (ref 0.93–1.7)
TRIGL SERPL-MCNC: 112 MG/DL (ref 0–150)
TSH SERPL DL<=0.05 MIU/L-ACNC: 0.18 UIU/ML (ref 0.27–4.2)
VLDLC SERPL-MCNC: 20 MG/DL (ref 5–40)
WBC NRBC COR # BLD: 12.09 10*3/MM3 (ref 3.4–10.8)

## 2021-12-25 PROCEDURE — 36415 COLL VENOUS BLD VENIPUNCTURE: CPT

## 2021-12-25 PROCEDURE — 84443 ASSAY THYROID STIM HORMONE: CPT

## 2021-12-25 PROCEDURE — 80061 LIPID PANEL: CPT

## 2021-12-25 PROCEDURE — 84439 ASSAY OF FREE THYROXINE: CPT

## 2021-12-25 PROCEDURE — 83036 HEMOGLOBIN GLYCOSYLATED A1C: CPT

## 2021-12-25 PROCEDURE — 85025 COMPLETE CBC W/AUTO DIFF WBC: CPT

## 2021-12-25 PROCEDURE — 80053 COMPREHEN METABOLIC PANEL: CPT

## 2022-02-17 ENCOUNTER — TRANSCRIBE ORDERS (OUTPATIENT)
Dept: ADMINISTRATIVE | Facility: HOSPITAL | Age: 42
End: 2022-02-17

## 2022-02-17 ENCOUNTER — HOSPITAL ENCOUNTER (OUTPATIENT)
Dept: GENERAL RADIOLOGY | Facility: HOSPITAL | Age: 42
Discharge: HOME | End: 2022-02-17
Admitting: NURSE PRACTITIONER

## 2022-02-17 DIAGNOSIS — M79.671 PAIN IN BOTH FEET: ICD-10-CM

## 2022-02-17 DIAGNOSIS — M79.672 PAIN IN BOTH FEET: Primary | ICD-10-CM

## 2022-02-17 DIAGNOSIS — M79.671 PAIN IN BOTH FEET: Primary | ICD-10-CM

## 2022-02-17 DIAGNOSIS — M79.672 PAIN IN BOTH FEET: ICD-10-CM

## 2022-02-17 PROCEDURE — 73630 X-RAY EXAM OF FOOT: CPT

## 2022-02-17 PROCEDURE — 73630 X-RAY EXAM OF FOOT: CPT | Performed by: RADIOLOGY

## 2022-06-21 ENCOUNTER — HOSPITAL ENCOUNTER (OUTPATIENT)
Dept: GENERAL RADIOLOGY | Facility: HOSPITAL | Age: 42
Discharge: HOME | End: 2022-06-21
Admitting: NURSE PRACTITIONER

## 2022-06-21 ENCOUNTER — TRANSCRIBE ORDERS (OUTPATIENT)
Dept: ADMINISTRATIVE | Facility: HOSPITAL | Age: 42
End: 2022-06-21

## 2022-06-21 DIAGNOSIS — R09.81 NASAL CONGESTION: Primary | ICD-10-CM

## 2022-06-21 DIAGNOSIS — R09.81 NASAL CONGESTION: ICD-10-CM

## 2022-06-21 PROCEDURE — 70160 X-RAY EXAM OF NASAL BONES: CPT | Performed by: RADIOLOGY

## 2022-06-21 PROCEDURE — 70160 X-RAY EXAM OF NASAL BONES: CPT

## 2022-06-23 ENCOUNTER — TRANSCRIBE ORDERS (OUTPATIENT)
Dept: ADMINISTRATIVE | Facility: HOSPITAL | Age: 42
End: 2022-06-23

## 2022-06-23 ENCOUNTER — LAB (OUTPATIENT)
Dept: LAB | Facility: HOSPITAL | Age: 42
End: 2022-06-23

## 2022-06-23 DIAGNOSIS — D72.829 LEUKOCYTOSIS, UNSPECIFIED TYPE: ICD-10-CM

## 2022-06-23 DIAGNOSIS — E03.9 MYXEDEMA HEART DISEASE: ICD-10-CM

## 2022-06-23 DIAGNOSIS — Z13.220 SCREENING FOR LIPOID DISORDERS: Primary | ICD-10-CM

## 2022-06-23 DIAGNOSIS — I51.9 MYXEDEMA HEART DISEASE: ICD-10-CM

## 2022-06-23 DIAGNOSIS — E11.9 DIABETES MELLITUS WITHOUT COMPLICATION: ICD-10-CM

## 2022-06-23 DIAGNOSIS — Z13.220 SCREENING FOR LIPOID DISORDERS: ICD-10-CM

## 2022-06-23 LAB
ANION GAP SERPL CALCULATED.3IONS-SCNC: 11 MMOL/L (ref 5–15)
BASOPHILS # BLD AUTO: 0.07 10*3/MM3 (ref 0–0.2)
BASOPHILS NFR BLD AUTO: 0.6 % (ref 0–1.5)
BUN SERPL-MCNC: 8 MG/DL (ref 6–20)
BUN/CREAT SERPL: 10 (ref 7–25)
CALCIUM SPEC-SCNC: 8.8 MG/DL (ref 8.6–10.5)
CHLORIDE SERPL-SCNC: 106 MMOL/L (ref 98–107)
CHOLEST SERPL-MCNC: 122 MG/DL (ref 0–200)
CO2 SERPL-SCNC: 21 MMOL/L (ref 22–29)
CREAT SERPL-MCNC: 0.8 MG/DL (ref 0.76–1.27)
DEPRECATED RDW RBC AUTO: 40 FL (ref 37–54)
EGFRCR SERPLBLD CKD-EPI 2021: 114.7 ML/MIN/1.73
EOSINOPHIL # BLD AUTO: 0.21 10*3/MM3 (ref 0–0.4)
EOSINOPHIL NFR BLD AUTO: 1.8 % (ref 0.3–6.2)
ERYTHROCYTE [DISTWIDTH] IN BLOOD BY AUTOMATED COUNT: 15.7 % (ref 12.3–15.4)
GLUCOSE SERPL-MCNC: 145 MG/DL (ref 65–99)
HBA1C MFR BLD: 7.3 % (ref 4.8–5.6)
HCT VFR BLD AUTO: 46.3 % (ref 37.5–51)
HDLC SERPL-MCNC: 33 MG/DL (ref 40–60)
HGB BLD-MCNC: 15.2 G/DL (ref 13–17.7)
IMM GRANULOCYTES # BLD AUTO: 0.08 10*3/MM3 (ref 0–0.05)
IMM GRANULOCYTES NFR BLD AUTO: 0.7 % (ref 0–0.5)
LDLC SERPL CALC-MCNC: 65 MG/DL (ref 0–100)
LDLC/HDLC SERPL: 1.87 {RATIO}
LYMPHOCYTES # BLD AUTO: 3.24 10*3/MM3 (ref 0.7–3.1)
LYMPHOCYTES NFR BLD AUTO: 27.2 % (ref 19.6–45.3)
MCH RBC QN AUTO: 25.1 PG (ref 26.6–33)
MCHC RBC AUTO-ENTMCNC: 32.8 G/DL (ref 31.5–35.7)
MCV RBC AUTO: 76.5 FL (ref 79–97)
MONOCYTES # BLD AUTO: 0.79 10*3/MM3 (ref 0.1–0.9)
MONOCYTES NFR BLD AUTO: 6.6 % (ref 5–12)
NEUTROPHILS NFR BLD AUTO: 63.1 % (ref 42.7–76)
NEUTROPHILS NFR BLD AUTO: 7.51 10*3/MM3 (ref 1.7–7)
NRBC BLD AUTO-RTO: 0 /100 WBC (ref 0–0.2)
PLATELET # BLD AUTO: 214 10*3/MM3 (ref 140–450)
PMV BLD AUTO: 10.7 FL (ref 6–12)
POTASSIUM SERPL-SCNC: 3.9 MMOL/L (ref 3.5–5.2)
RBC # BLD AUTO: 6.05 10*6/MM3 (ref 4.14–5.8)
SODIUM SERPL-SCNC: 138 MMOL/L (ref 136–145)
T4 SERPL-MCNC: 10 MCG/DL (ref 4.5–11.7)
TRIGL SERPL-MCNC: 136 MG/DL (ref 0–150)
TSH SERPL DL<=0.05 MIU/L-ACNC: 0.27 UIU/ML (ref 0.27–4.2)
VLDLC SERPL-MCNC: 24 MG/DL (ref 5–40)
WBC NRBC COR # BLD: 11.9 10*3/MM3 (ref 3.4–10.8)

## 2022-06-23 PROCEDURE — 85025 COMPLETE CBC W/AUTO DIFF WBC: CPT

## 2022-06-23 PROCEDURE — 84436 ASSAY OF TOTAL THYROXINE: CPT

## 2022-06-23 PROCEDURE — 83036 HEMOGLOBIN GLYCOSYLATED A1C: CPT

## 2022-06-23 PROCEDURE — 80048 BASIC METABOLIC PNL TOTAL CA: CPT

## 2022-06-23 PROCEDURE — 84443 ASSAY THYROID STIM HORMONE: CPT

## 2022-06-23 PROCEDURE — 36415 COLL VENOUS BLD VENIPUNCTURE: CPT

## 2022-06-23 PROCEDURE — 80061 LIPID PANEL: CPT

## 2022-07-29 ENCOUNTER — HOSPITAL ENCOUNTER (EMERGENCY)
Facility: HOSPITAL | Age: 42
Discharge: HOME | End: 2022-07-29
Attending: EMERGENCY MEDICINE | Admitting: EMERGENCY MEDICINE

## 2022-07-29 ENCOUNTER — APPOINTMENT (OUTPATIENT)
Dept: GENERAL RADIOLOGY | Facility: HOSPITAL | Age: 42
End: 2022-07-29

## 2022-07-29 VITALS
OXYGEN SATURATION: 97 % | SYSTOLIC BLOOD PRESSURE: 125 MMHG | WEIGHT: 195 LBS | BODY MASS INDEX: 31.34 KG/M2 | HEART RATE: 96 BPM | RESPIRATION RATE: 16 BRPM | DIASTOLIC BLOOD PRESSURE: 84 MMHG | TEMPERATURE: 97.5 F | HEIGHT: 66 IN

## 2022-07-29 DIAGNOSIS — S89.92XA INJURY OF LEFT KNEE, INITIAL ENCOUNTER: Primary | ICD-10-CM

## 2022-07-29 PROCEDURE — 73562 X-RAY EXAM OF KNEE 3: CPT | Performed by: RADIOLOGY

## 2022-07-29 PROCEDURE — 73562 X-RAY EXAM OF KNEE 3: CPT

## 2022-07-29 PROCEDURE — 99282 EMERGENCY DEPT VISIT SF MDM: CPT

## 2022-08-10 ENCOUNTER — TRANSCRIBE ORDERS (OUTPATIENT)
Dept: ADMINISTRATIVE | Facility: HOSPITAL | Age: 42
End: 2022-08-10

## 2022-08-10 DIAGNOSIS — M25.562 LEFT KNEE PAIN, UNSPECIFIED CHRONICITY: Primary | ICD-10-CM

## 2022-08-30 ENCOUNTER — HOSPITAL ENCOUNTER (OUTPATIENT)
Dept: MRI IMAGING | Facility: HOSPITAL | Age: 42
Discharge: HOME | End: 2022-08-30
Admitting: NURSE PRACTITIONER

## 2022-08-30 DIAGNOSIS — M25.562 LEFT KNEE PAIN, UNSPECIFIED CHRONICITY: ICD-10-CM

## 2022-08-30 PROCEDURE — 73721 MRI JNT OF LWR EXTRE W/O DYE: CPT | Performed by: RADIOLOGY

## 2022-08-30 PROCEDURE — 73721 MRI JNT OF LWR EXTRE W/O DYE: CPT

## 2022-10-07 ENCOUNTER — LAB (OUTPATIENT)
Dept: LAB | Facility: HOSPITAL | Age: 42
End: 2022-10-07

## 2022-10-07 ENCOUNTER — TRANSCRIBE ORDERS (OUTPATIENT)
Dept: ADMINISTRATIVE | Facility: HOSPITAL | Age: 42
End: 2022-10-07

## 2022-10-07 DIAGNOSIS — E03.9 HYPOTHYROIDISM, UNSPECIFIED TYPE: ICD-10-CM

## 2022-10-07 DIAGNOSIS — E03.9 HYPOTHYROIDISM, UNSPECIFIED TYPE: Primary | ICD-10-CM

## 2022-10-07 LAB
T3FREE SERPL-MCNC: 2.8 PG/ML (ref 2–4.4)
T4 FREE SERPL-MCNC: 1.47 NG/DL (ref 0.93–1.7)
TSH SERPL DL<=0.05 MIU/L-ACNC: 4.81 UIU/ML (ref 0.27–4.2)

## 2022-10-07 PROCEDURE — 84481 FREE ASSAY (FT-3): CPT

## 2022-10-07 PROCEDURE — 36415 COLL VENOUS BLD VENIPUNCTURE: CPT

## 2022-10-07 PROCEDURE — 84439 ASSAY OF FREE THYROXINE: CPT

## 2022-10-07 PROCEDURE — 84443 ASSAY THYROID STIM HORMONE: CPT

## 2022-11-02 ENCOUNTER — OFFICE VISIT (OUTPATIENT)
Dept: ORTHOPEDIC SURGERY | Facility: CLINIC | Age: 42
End: 2022-11-02

## 2022-11-02 VITALS
BODY MASS INDEX: 32.05 KG/M2 | HEIGHT: 66 IN | DIASTOLIC BLOOD PRESSURE: 86 MMHG | SYSTOLIC BLOOD PRESSURE: 127 MMHG | WEIGHT: 199.4 LBS | HEART RATE: 93 BPM

## 2022-11-02 DIAGNOSIS — S83.241A ACUTE MEDIAL MENISCUS TEAR OF RIGHT KNEE, INITIAL ENCOUNTER: ICD-10-CM

## 2022-11-02 DIAGNOSIS — M25.561 ACUTE PAIN OF RIGHT KNEE: Primary | ICD-10-CM

## 2022-11-02 PROCEDURE — 99203 OFFICE O/P NEW LOW 30 MIN: CPT | Performed by: ORTHOPAEDIC SURGERY

## 2022-11-02 PROCEDURE — 20610 DRAIN/INJ JOINT/BURSA W/O US: CPT | Performed by: ORTHOPAEDIC SURGERY

## 2022-11-02 RX ORDER — UBIDECARENONE 75 MG
50 CAPSULE ORAL DAILY
COMMUNITY
End: 2023-04-05

## 2022-11-02 RX ADMIN — LIDOCAINE HYDROCHLORIDE 3 ML: 10 INJECTION, SOLUTION EPIDURAL; INFILTRATION; INTRACAUDAL; PERINEURAL at 17:19

## 2022-11-02 RX ADMIN — METHYLPREDNISOLONE ACETATE 40 MG: 40 INJECTION, SUSPENSION INTRA-ARTICULAR; INTRALESIONAL; INTRAMUSCULAR; SOFT TISSUE at 17:19

## 2022-11-02 NOTE — PROGRESS NOTES
New Patient Visit      Patient: Camilo Harper  YOB: 1980  Date of Encounter: 2022        Chief Complaint:   Chief Complaint   Patient presents with   • Left Knee - Pain, Initial Evaluation           HPI:   Camilo Harper, 42 y.o. male, referred by Stephanie Green APRN presents he has experienced left knee pain with popping sensation he localizes beneath his patella.  He states that after his knee pops he usually feels better.  He denies left knee problems prior to the above event.  He has had previous right knee problems from football injury.  He denies giving way or locking describes the sensation of his knee being out of place that he can improve by moving his knee.  He denies weakness to his left leg.  His medical history includes type 2 diabetes and thyroid disease.  He has had MRI completed.  Reports no improvement in his knee since his injury.      Active Problem List:  Patient Active Problem List   Diagnosis   • Cough   • Dizziness   • Abnormal EKG   • Essential hypertension   • Type 2 diabetes mellitus without complication, without long-term current use of insulin (HCC)   • Family history of premature coronary artery disease   • Precordial pain           Past Medical History:  Past Medical History:   Diagnosis Date   • Diabetes mellitus (HCC)    • Hypertension    • Thyroid disease            Past Surgical History:  Past Surgical History:   Procedure Laterality Date   • VASECTOMY             Family History:  Family History   Problem Relation Age of Onset   • Hypertension Father    • Heart disease Father    • Hypertension Mother    • Heart disease Mother    • Urolithiasis Maternal Uncle          Social History:  Social History     Socioeconomic History   • Marital status: Single   Tobacco Use   • Smoking status: Former     Packs/day: 2.00     Types: Cigarettes     Quit date: 2017     Years since quittin.8   • Smokeless tobacco: Never   Substance and Sexual Activity   •  "Alcohol use: No   • Drug use: No   • Sexual activity: Defer     Body mass index is 32.2 kg/m².      Medications:  Current Outpatient Medications   Medication Sig Dispense Refill   • aspirin 81 MG EC tablet Take 81 mg by mouth Daily.     • carvedilol (COREG) 6.25 MG tablet 6.25 mg 2 (Two) Times a Day With Meals.  0   • Diclofenac Sodium (VOLTAREN) 1 % gel gel diclofenac 1 % topical gel   APPLY 2 GRAMS TOPICALLY TO THE AFFECTED AREA FOUR TIMES DAILY     • Empagliflozin (JARDIANCE PO) Take  by mouth Daily.     • levothyroxine (SYNTHROID, LEVOTHROID) 175 MCG tablet 150 mcg Daily.  0   • metFORMIN (GLUCOPHAGE) 500 MG tablet 1,000 mg 2 (Two) Times a Day With Meals.  0   • pantoprazole (PROTONIX) 40 MG EC tablet 40 mg Daily.  0   • sertraline (ZOLOFT) 50 MG tablet Take 1 tablet by mouth Daily.     • vitamin B-12 (CYANOCOBALAMIN) 100 MCG tablet Take 50 mcg by mouth Daily.     • ciprofloxacin (CIPRO) 500 MG tablet Take 1 tablet by mouth 2 (Two) Times a Day. 19 tablet 0   • metroNIDAZOLE (FLAGYL) 500 MG tablet Take 1 tablet by mouth 3 (Three) Times a Day. 20 tablet 0     No current facility-administered medications for this visit.         Allergies:  No Known Allergies      Review of Systems:   Review of Systems   Constitutional: Negative.    HENT: Negative.    Eyes: Negative.    Respiratory: Negative.    Cardiovascular: Negative.    Gastrointestinal: Negative.    Endocrine: Negative.    Genitourinary: Negative.    Musculoskeletal: Positive for arthralgias.   Skin: Negative.    Allergic/Immunologic: Negative.    Neurological: Negative.    Hematological: Negative.    Psychiatric/Behavioral: Negative.          Physical Exam:   Physical Exam  GENERAL: 42 y.o. male, alert and oriented X 3 in no acute distress.   Visit Vitals  /86   Pulse 93   Ht 167.6 cm (65.98\")   Wt 90.4 kg (199 lb 6.4 oz)   BMI 32.20 kg/m²         Musculoskeletal:   Examination left knee reveals mild effusion moderate medial joint line tenderness and " moderately positive Yana no gross instability with varus valgus stressing Lachman or drawer he has full extension and flexion normal patellar tracking without crepitance or pain.  The distal pole of patella is moderately tender to palpation.        Radiology/Labs:     Radiographs left knee previously obtained nonweightbearing by report describe very early mild osteoarthritis with preservation of joint space my review confirms with mild sclerosis minimal joint space narrowing squaring of the medial lateral femoral condyles.  Also noted on radiographs is osteophyte superior and inferior patella superficially.  The distal patellar osteophyte appears fractured.    MRI by report describes degenerative signal medial meniscus with possible acute component extending to the superior articular surface my review confirms the above.      Assessment & Plan:   42 y.o. male presents with acute knee complaints beginning months ago with very little improvement with MRI identifying abnormal signal medial meniscus both degenerative and possibly acute tear.  Before considering arthroscopy left knee today he underwent aspiration removing 5 cc of serous fluid injecting Depo-Medrol 40 mg intra-articular.  We will see him back in 3 weeks and assess his response.        ICD-10-CM ICD-9-CM   1. Acute pain of right knee  M25.561 719.46   2. Acute medial meniscus tear of right knee, initial encounter  S83.241A 836.0         Large Joint Arthrocentesis: L knee  Date/Time: 11/2/2022 5:19 PM  Consent given by: patient  Site marked: site marked  Timeout: Immediately prior to procedure a time out was called to verify the correct patient, procedure, equipment, support staff and site/side marked as required   Supporting Documentation  Indications: pain and joint swelling   Procedure Details  Location: knee - L knee  Preparation: Patient was prepped and draped in the usual sterile fashion  Needle size: 18 G  Approach: anterolateral  Medications  administered: 40 mg methylPREDNISolone acetate 40 MG/ML; 3 mL lidocaine PF 1% 1 %  Aspirate amount: 5 mL  Aspirate: serous  Patient tolerance: patient tolerated the procedure well with no immediate complications            Cc:   Stephanie Green APRN                This document has been electronically signed by Cedric Castro MD   November 2, 2022 17:18 EDT

## 2022-11-07 RX ORDER — LIDOCAINE HYDROCHLORIDE 10 MG/ML
3 INJECTION, SOLUTION EPIDURAL; INFILTRATION; INTRACAUDAL; PERINEURAL
Status: COMPLETED | OUTPATIENT
Start: 2022-11-02 | End: 2022-11-02

## 2022-11-07 RX ORDER — METHYLPREDNISOLONE ACETATE 40 MG/ML
40 INJECTION, SUSPENSION INTRA-ARTICULAR; INTRALESIONAL; INTRAMUSCULAR; SOFT TISSUE
Status: COMPLETED | OUTPATIENT
Start: 2022-11-02 | End: 2022-11-02

## 2022-11-14 ENCOUNTER — TELEPHONE (OUTPATIENT)
Dept: ORTHOPEDICS | Facility: OTHER | Age: 42
End: 2022-11-14

## 2022-11-14 NOTE — TELEPHONE ENCOUNTER
Caller: JAYJAY THOMAS    Relationship to patient: SELF    Best call back number: [phone]    Patient is needing: PATIENT HAS NOT HAD ANY RELIEF FROM THE TWO SHOTS THAT WERE ADMINISTERED ON HIS LEFT KNEE AND THEY HAVEN'T IMPROVED.  WOULD LIKE TO DISCUSS WITH DR. SIMON ABOUT SCHEDULING A SCOPE

## 2022-11-23 ENCOUNTER — OFFICE VISIT (OUTPATIENT)
Dept: ORTHOPEDIC SURGERY | Facility: CLINIC | Age: 42
End: 2022-11-23

## 2022-11-23 VITALS — HEIGHT: 66 IN | WEIGHT: 199 LBS | BODY MASS INDEX: 31.98 KG/M2

## 2022-11-23 DIAGNOSIS — M25.562 ACUTE PAIN OF LEFT KNEE: Primary | ICD-10-CM

## 2022-11-23 PROCEDURE — 99213 OFFICE O/P EST LOW 20 MIN: CPT | Performed by: ORTHOPAEDIC SURGERY

## 2022-12-17 ENCOUNTER — LAB (OUTPATIENT)
Dept: LAB | Facility: HOSPITAL | Age: 42
End: 2022-12-17

## 2022-12-17 ENCOUNTER — TRANSCRIBE ORDERS (OUTPATIENT)
Dept: LAB | Facility: HOSPITAL | Age: 42
End: 2022-12-17

## 2022-12-17 DIAGNOSIS — E78.6 FAMILIAL LIPOPROTEIN DEFICIENCY: ICD-10-CM

## 2022-12-17 DIAGNOSIS — E78.6 FAMILIAL LIPOPROTEIN DEFICIENCY: Primary | ICD-10-CM

## 2022-12-17 DIAGNOSIS — E03.9 HYPOTHYROIDISM, ADULT: ICD-10-CM

## 2022-12-17 DIAGNOSIS — E11.9 DIABETES MELLITUS WITHOUT COMPLICATION: ICD-10-CM

## 2022-12-17 LAB
ALBUMIN SERPL-MCNC: 4.2 G/DL (ref 3.5–5.2)
ALBUMIN/GLOB SERPL: 1.6 G/DL
ALP SERPL-CCNC: 98 U/L (ref 39–117)
ALT SERPL W P-5'-P-CCNC: 24 U/L (ref 1–41)
ANION GAP SERPL CALCULATED.3IONS-SCNC: 10 MMOL/L (ref 5–15)
AST SERPL-CCNC: 24 U/L (ref 1–40)
BASOPHILS # BLD AUTO: 0.1 10*3/MM3 (ref 0–0.2)
BASOPHILS NFR BLD AUTO: 0.7 % (ref 0–1.5)
BILIRUB SERPL-MCNC: 0.4 MG/DL (ref 0–1.2)
BUN SERPL-MCNC: 7 MG/DL (ref 6–20)
BUN/CREAT SERPL: 8.6 (ref 7–25)
CALCIUM SPEC-SCNC: 8.9 MG/DL (ref 8.6–10.5)
CHLORIDE SERPL-SCNC: 107 MMOL/L (ref 98–107)
CHOLEST SERPL-MCNC: 138 MG/DL (ref 0–200)
CO2 SERPL-SCNC: 23 MMOL/L (ref 22–29)
CREAT SERPL-MCNC: 0.81 MG/DL (ref 0.76–1.27)
DEPRECATED RDW RBC AUTO: 45.2 FL (ref 37–54)
EGFRCR SERPLBLD CKD-EPI 2021: 113.6 ML/MIN/1.73
EOSINOPHIL # BLD AUTO: 1.98 10*3/MM3 (ref 0–0.4)
EOSINOPHIL NFR BLD AUTO: 13.4 % (ref 0.3–6.2)
ERYTHROCYTE [DISTWIDTH] IN BLOOD BY AUTOMATED COUNT: 16.1 % (ref 12.3–15.4)
GLOBULIN UR ELPH-MCNC: 2.6 GM/DL
GLUCOSE SERPL-MCNC: 107 MG/DL (ref 65–99)
HBA1C MFR BLD: 7.6 % (ref 4.8–5.6)
HCT VFR BLD AUTO: 46.5 % (ref 37.5–51)
HDLC SERPL-MCNC: 36 MG/DL (ref 40–60)
HGB BLD-MCNC: 14.7 G/DL (ref 13–17.7)
IMM GRANULOCYTES # BLD AUTO: 0.19 10*3/MM3 (ref 0–0.05)
IMM GRANULOCYTES NFR BLD AUTO: 1.3 % (ref 0–0.5)
LDLC SERPL CALC-MCNC: 77 MG/DL (ref 0–100)
LDLC/HDLC SERPL: 2.03 {RATIO}
LYMPHOCYTES # BLD AUTO: 3.74 10*3/MM3 (ref 0.7–3.1)
LYMPHOCYTES NFR BLD AUTO: 25.3 % (ref 19.6–45.3)
MCH RBC QN AUTO: 24.7 PG (ref 26.6–33)
MCHC RBC AUTO-ENTMCNC: 31.6 G/DL (ref 31.5–35.7)
MCV RBC AUTO: 78 FL (ref 79–97)
MONOCYTES # BLD AUTO: 0.98 10*3/MM3 (ref 0.1–0.9)
MONOCYTES NFR BLD AUTO: 6.6 % (ref 5–12)
NEUTROPHILS NFR BLD AUTO: 52.7 % (ref 42.7–76)
NEUTROPHILS NFR BLD AUTO: 7.82 10*3/MM3 (ref 1.7–7)
NRBC BLD AUTO-RTO: 0 /100 WBC (ref 0–0.2)
PLATELET # BLD AUTO: 213 10*3/MM3 (ref 140–450)
PMV BLD AUTO: 11.1 FL (ref 6–12)
POTASSIUM SERPL-SCNC: 4.5 MMOL/L (ref 3.5–5.2)
PROT SERPL-MCNC: 6.8 G/DL (ref 6–8.5)
RBC # BLD AUTO: 5.96 10*6/MM3 (ref 4.14–5.8)
SODIUM SERPL-SCNC: 140 MMOL/L (ref 136–145)
T4 FREE SERPL-MCNC: 2.15 NG/DL (ref 0.93–1.7)
TRIGL SERPL-MCNC: 145 MG/DL (ref 0–150)
TSH SERPL DL<=0.05 MIU/L-ACNC: 0.32 UIU/ML (ref 0.27–4.2)
VLDLC SERPL-MCNC: 25 MG/DL (ref 5–40)
WBC NRBC COR # BLD: 14.81 10*3/MM3 (ref 3.4–10.8)

## 2022-12-17 PROCEDURE — 36415 COLL VENOUS BLD VENIPUNCTURE: CPT

## 2022-12-17 PROCEDURE — 83036 HEMOGLOBIN GLYCOSYLATED A1C: CPT

## 2022-12-17 PROCEDURE — 80050 GENERAL HEALTH PANEL: CPT

## 2022-12-17 PROCEDURE — 84439 ASSAY OF FREE THYROXINE: CPT

## 2022-12-17 PROCEDURE — 80061 LIPID PANEL: CPT

## 2022-12-21 ENCOUNTER — OFFICE VISIT (OUTPATIENT)
Dept: ORTHOPEDIC SURGERY | Facility: CLINIC | Age: 42
End: 2022-12-21

## 2022-12-21 VITALS — BODY MASS INDEX: 31.99 KG/M2 | HEIGHT: 66 IN | WEIGHT: 199.08 LBS

## 2022-12-21 DIAGNOSIS — M17.12 PRIMARY OSTEOARTHRITIS OF LEFT KNEE: Primary | ICD-10-CM

## 2022-12-21 PROCEDURE — 20610 DRAIN/INJ JOINT/BURSA W/O US: CPT | Performed by: ORTHOPAEDIC SURGERY

## 2022-12-21 RX ADMIN — LIDOCAINE HYDROCHLORIDE 3 ML: 10 INJECTION, SOLUTION EPIDURAL; INFILTRATION; INTRACAUDAL; PERINEURAL at 10:03

## 2022-12-21 NOTE — PROGRESS NOTES
Follow-up Visit         Patient: Camilo Harper  YOB: 1980  Date of Encounter: 2022      Chief  Complaint:   Chief Complaint   Patient presents with   • Left Knee - Osteoarthritis, Follow-up     Synvisc One injection auth [id number] exp 23         HPI:  Camilo Harper, 42 y.o. male presents in follow-up left knee pain of several months duration with grinding sensation which is uncomfortable.  He has had MRI completed that show obvious medial meniscus tear.  He has received intra-articular steroid injection with moderate improvement.  We have discussed his options but before considering arthroscopy left knee we have agreed to treat with viscous injection.        Medical History:  Patient Active Problem List   Diagnosis   • Cough   • Dizziness   • Abnormal EKG   • Essential hypertension   • Type 2 diabetes mellitus without complication, without long-term current use of insulin (HCC)   • Family history of premature coronary artery disease   • Precordial pain   • Primary osteoarthritis of left knee     Past Medical History:   Diagnosis Date   • Diabetes mellitus (HCC)    • Hypertension    • Thyroid disease          Social History:  Social History     Socioeconomic History   • Marital status: Single   Tobacco Use   • Smoking status: Former     Packs/day: 2.00     Types: Cigarettes     Quit date: 2017     Years since quittin.9   • Smokeless tobacco: Never   Vaping Use   • Vaping Use: Never used   Substance and Sexual Activity   • Alcohol use: No   • Drug use: No   • Sexual activity: Defer         Current Medications:    Current Outpatient Medications:   •  aspirin 81 MG EC tablet, Take 81 mg by mouth Daily., Disp: , Rfl:   •  carvedilol (COREG) 6.25 MG tablet, 6.25 mg 2 (Two) Times a Day With Meals., Disp: , Rfl: 0  •  Diclofenac Sodium (VOLTAREN) 1 % gel gel, diclofenac 1 % topical gel  APPLY 2 GRAMS TOPICALLY TO THE AFFECTED AREA FOUR TIMES DAILY, Disp: , Rfl:   •  Empagliflozin  (JARDIANCE PO), Take  by mouth Daily., Disp: , Rfl:   •  levothyroxine (SYNTHROID, LEVOTHROID) 175 MCG tablet, 150 mcg Daily., Disp: , Rfl: 0  •  metFORMIN (GLUCOPHAGE) 500 MG tablet, 1,000 mg 2 (Two) Times a Day With Meals., Disp: , Rfl: 0  •  pantoprazole (PROTONIX) 40 MG EC tablet, 40 mg Daily., Disp: , Rfl: 0  •  sertraline (ZOLOFT) 50 MG tablet, Take 1 tablet by mouth Daily., Disp: , Rfl:   •  vitamin B-12 (CYANOCOBALAMIN) 100 MCG tablet, Take 50 mcg by mouth Daily., Disp: , Rfl:       Allergies:  No Known Allergies      Family History:  Family History   Problem Relation Age of Onset   • Hypertension Father    • Heart disease Father    • Hypertension Mother    • Heart disease Mother    • Urolithiasis Maternal Uncle          Surgical History:  Past Surgical History:   Procedure Laterality Date   • VASECTOMY         Examination:   Examination left knee demonstrates no significant effusion full flexion extension no instability.        Assessment & Plan:   42 y.o. male presents follow-up left knee complaints told for viscous injection.  Today he is provided Synvisc 1 intra-articular with lidocaine block left knee.  He will return as needed depending on his response.         Diagnosis Plan   1. Primary osteoarthritis of left knee  Large Joint Arthrocentesis: L knee            Large Joint Arthrocentesis: L knee  Date/Time: 12/21/2022 10:03 AM  Consent given by: patient  Site marked: site marked  Timeout: Immediately prior to procedure a time out was called to verify the correct patient, procedure, equipment, support staff and site/side marked as required   Supporting Documentation  Indications: pain   Procedure Details  Location: knee - L knee  Preparation: Patient was prepped and draped in the usual sterile fashion  Needle size: 25 G  Approach: anterolateral  Medications administered: 3 mL lidocaine PF 1% 1 %; 48 mg hylan 48 MG/6ML  Patient tolerance: patient tolerated the procedure well with no immediate  complications              Cc:  Stephanie Green, HANNA              This document has been electronically signed by Cedric Castro MD   December 29, 2022 07:56 EST

## 2022-12-29 RX ORDER — LIDOCAINE HYDROCHLORIDE 10 MG/ML
3 INJECTION, SOLUTION EPIDURAL; INFILTRATION; INTRACAUDAL; PERINEURAL
Status: COMPLETED | OUTPATIENT
Start: 2022-12-21 | End: 2022-12-21

## 2023-01-18 ENCOUNTER — LAB (OUTPATIENT)
Dept: LAB | Facility: HOSPITAL | Age: 43
End: 2023-01-18
Payer: COMMERCIAL

## 2023-01-18 ENCOUNTER — TRANSCRIBE ORDERS (OUTPATIENT)
Dept: ADMINISTRATIVE | Facility: HOSPITAL | Age: 43
End: 2023-01-18
Payer: COMMERCIAL

## 2023-01-18 DIAGNOSIS — D72.829 LEUKOCYTOSIS, UNSPECIFIED TYPE: Primary | ICD-10-CM

## 2023-01-18 DIAGNOSIS — R71.8 OTHER ABNORMALITY OF RED BLOOD CELLS: ICD-10-CM

## 2023-01-18 DIAGNOSIS — D72.829 LEUKOCYTOSIS, UNSPECIFIED TYPE: ICD-10-CM

## 2023-01-18 PROCEDURE — 82607 VITAMIN B-12: CPT

## 2023-01-18 PROCEDURE — 82728 ASSAY OF FERRITIN: CPT

## 2023-01-18 PROCEDURE — 83540 ASSAY OF IRON: CPT

## 2023-01-18 PROCEDURE — 82746 ASSAY OF FOLIC ACID SERUM: CPT

## 2023-01-18 PROCEDURE — 84466 ASSAY OF TRANSFERRIN: CPT

## 2023-01-18 PROCEDURE — 85025 COMPLETE CBC W/AUTO DIFF WBC: CPT

## 2023-01-18 PROCEDURE — 36415 COLL VENOUS BLD VENIPUNCTURE: CPT

## 2023-01-19 LAB
BASOPHILS # BLD AUTO: 0.05 10*3/MM3 (ref 0–0.2)
BASOPHILS NFR BLD AUTO: 0.4 % (ref 0–1.5)
DEPRECATED RDW RBC AUTO: 41.1 FL (ref 37–54)
EOSINOPHIL # BLD AUTO: 1.27 10*3/MM3 (ref 0–0.4)
EOSINOPHIL NFR BLD AUTO: 10.1 % (ref 0.3–6.2)
ERYTHROCYTE [DISTWIDTH] IN BLOOD BY AUTOMATED COUNT: 14.7 % (ref 12.3–15.4)
FERRITIN SERPL-MCNC: 13.3 NG/ML (ref 30–400)
FOLATE SERPL-MCNC: 15.5 NG/ML (ref 4.78–24.2)
HCT VFR BLD AUTO: 46.7 % (ref 37.5–51)
HGB BLD-MCNC: 14.5 G/DL (ref 13–17.7)
IMM GRANULOCYTES # BLD AUTO: 0.05 10*3/MM3 (ref 0–0.05)
IMM GRANULOCYTES NFR BLD AUTO: 0.4 % (ref 0–0.5)
IRON 24H UR-MRATE: 35 MCG/DL (ref 59–158)
IRON SATN MFR SERPL: 7 % (ref 20–50)
LYMPHOCYTES # BLD AUTO: 3.63 10*3/MM3 (ref 0.7–3.1)
LYMPHOCYTES NFR BLD AUTO: 28.9 % (ref 19.6–45.3)
MCH RBC QN AUTO: 24.2 PG (ref 26.6–33)
MCHC RBC AUTO-ENTMCNC: 31 G/DL (ref 31.5–35.7)
MCV RBC AUTO: 78.1 FL (ref 79–97)
MONOCYTES # BLD AUTO: 0.98 10*3/MM3 (ref 0.1–0.9)
MONOCYTES NFR BLD AUTO: 7.8 % (ref 5–12)
NEUTROPHILS NFR BLD AUTO: 52.4 % (ref 42.7–76)
NEUTROPHILS NFR BLD AUTO: 6.57 10*3/MM3 (ref 1.7–7)
NRBC BLD AUTO-RTO: 0.1 /100 WBC (ref 0–0.2)
PLATELET # BLD AUTO: 253 10*3/MM3 (ref 140–450)
PMV BLD AUTO: 10.7 FL (ref 6–12)
RBC # BLD AUTO: 5.98 10*6/MM3 (ref 4.14–5.8)
TIBC SERPL-MCNC: 495 MCG/DL (ref 298–536)
TRANSFERRIN SERPL-MCNC: 332 MG/DL (ref 200–360)
VIT B12 BLD-MCNC: 1566 PG/ML (ref 211–946)
WBC NRBC COR # BLD: 12.55 10*3/MM3 (ref 3.4–10.8)

## 2023-02-08 ENCOUNTER — LAB (OUTPATIENT)
Dept: LAB | Facility: HOSPITAL | Age: 43
End: 2023-02-08
Payer: COMMERCIAL

## 2023-02-08 ENCOUNTER — LAB (OUTPATIENT)
Dept: ONCOLOGY | Facility: CLINIC | Age: 43
End: 2023-02-08
Payer: COMMERCIAL

## 2023-02-08 ENCOUNTER — CONSULT (OUTPATIENT)
Dept: ONCOLOGY | Facility: CLINIC | Age: 43
End: 2023-02-08
Payer: COMMERCIAL

## 2023-02-08 VITALS
HEIGHT: 66 IN | WEIGHT: 189 LBS | SYSTOLIC BLOOD PRESSURE: 127 MMHG | HEART RATE: 90 BPM | OXYGEN SATURATION: 98 % | RESPIRATION RATE: 18 BRPM | DIASTOLIC BLOOD PRESSURE: 82 MMHG | BODY MASS INDEX: 30.37 KG/M2 | TEMPERATURE: 97.3 F

## 2023-02-08 DIAGNOSIS — D50.9 MICROCYTIC ANEMIA: ICD-10-CM

## 2023-02-08 DIAGNOSIS — D50.9 MICROCYTIC ANEMIA: Primary | ICD-10-CM

## 2023-02-08 DIAGNOSIS — D72.829 LEUKOCYTOSIS, UNSPECIFIED TYPE: ICD-10-CM

## 2023-02-08 DIAGNOSIS — D50.9 IRON DEFICIENCY ANEMIA, UNSPECIFIED IRON DEFICIENCY ANEMIA TYPE: ICD-10-CM

## 2023-02-08 DIAGNOSIS — D75.1 ERYTHROCYTOSIS: ICD-10-CM

## 2023-02-08 LAB
ALBUMIN SERPL-MCNC: 4.3 G/DL (ref 3.5–5.2)
ALBUMIN/GLOB SERPL: 1.3 G/DL
ALP SERPL-CCNC: 95 U/L (ref 39–117)
ALT SERPL W P-5'-P-CCNC: 18 U/L (ref 1–41)
ANION GAP SERPL CALCULATED.3IONS-SCNC: 9.2 MMOL/L (ref 5–15)
AST SERPL-CCNC: 17 U/L (ref 1–40)
B PARAPERT DNA SPEC QL NAA+PROBE: NOT DETECTED
B PERT DNA SPEC QL NAA+PROBE: NOT DETECTED
BASOPHILS # BLD AUTO: 0.05 10*3/MM3 (ref 0–0.2)
BASOPHILS NFR BLD AUTO: 0.4 % (ref 0–1.5)
BILIRUB SERPL-MCNC: 0.5 MG/DL (ref 0–1.2)
BUN SERPL-MCNC: 7 MG/DL (ref 6–20)
BUN/CREAT SERPL: 8.8 (ref 7–25)
C PNEUM DNA NPH QL NAA+NON-PROBE: NOT DETECTED
CALCIUM SPEC-SCNC: 9.2 MG/DL (ref 8.6–10.5)
CHLORIDE SERPL-SCNC: 104 MMOL/L (ref 98–107)
CO2 SERPL-SCNC: 25.8 MMOL/L (ref 22–29)
CREAT SERPL-MCNC: 0.8 MG/DL (ref 0.76–1.27)
CRP SERPL-MCNC: 3.31 MG/DL (ref 0–0.5)
DEPRECATED RDW RBC AUTO: 41.5 FL (ref 37–54)
EGFRCR SERPLBLD CKD-EPI 2021: 114 ML/MIN/1.73
EOSINOPHIL # BLD AUTO: 0.34 10*3/MM3 (ref 0–0.4)
EOSINOPHIL NFR BLD AUTO: 2.5 % (ref 0.3–6.2)
ERYTHROCYTE [DISTWIDTH] IN BLOOD BY AUTOMATED COUNT: 15.2 % (ref 12.3–15.4)
ERYTHROCYTE [SEDIMENTATION RATE] IN BLOOD: 21 MM/HR (ref 0–15)
FERRITIN SERPL-MCNC: 21.68 NG/ML (ref 30–400)
FLUAV SUBTYP SPEC NAA+PROBE: NOT DETECTED
FLUBV RNA ISLT QL NAA+PROBE: NOT DETECTED
GLOBULIN UR ELPH-MCNC: 3.2 GM/DL
GLUCOSE SERPL-MCNC: 109 MG/DL (ref 65–99)
HADV DNA SPEC NAA+PROBE: NOT DETECTED
HAV IGM SERPL QL IA: NORMAL
HBV CORE IGM SERPL QL IA: NORMAL
HBV SURFACE AG SERPL QL IA: NORMAL
HCOV 229E RNA SPEC QL NAA+PROBE: NOT DETECTED
HCOV HKU1 RNA SPEC QL NAA+PROBE: NOT DETECTED
HCOV NL63 RNA SPEC QL NAA+PROBE: NOT DETECTED
HCOV OC43 RNA SPEC QL NAA+PROBE: DETECTED
HCT VFR BLD AUTO: 47.2 % (ref 37.5–51)
HCV AB SER DONR QL: NORMAL
HGB BLD-MCNC: 15 G/DL (ref 13–17.7)
HIV1+2 AB SER QL: NORMAL
HMPV RNA NPH QL NAA+NON-PROBE: NOT DETECTED
HPIV1 RNA ISLT QL NAA+PROBE: NOT DETECTED
HPIV2 RNA SPEC QL NAA+PROBE: NOT DETECTED
HPIV3 RNA NPH QL NAA+PROBE: NOT DETECTED
HPIV4 P GENE NPH QL NAA+PROBE: NOT DETECTED
IMM GRANULOCYTES # BLD AUTO: 0.08 10*3/MM3 (ref 0–0.05)
IMM GRANULOCYTES NFR BLD AUTO: 0.6 % (ref 0–0.5)
IRON 24H UR-MRATE: 110 MCG/DL (ref 59–158)
IRON SATN MFR SERPL: 24 % (ref 20–50)
LDH SERPL-CCNC: 164 U/L (ref 135–225)
LYMPHOCYTES # BLD AUTO: 3.08 10*3/MM3 (ref 0.7–3.1)
LYMPHOCYTES NFR BLD AUTO: 22.4 % (ref 19.6–45.3)
M PNEUMO IGG SER IA-ACNC: NOT DETECTED
MCH RBC QN AUTO: 24.9 PG (ref 26.6–33)
MCHC RBC AUTO-ENTMCNC: 31.8 G/DL (ref 31.5–35.7)
MCV RBC AUTO: 78.4 FL (ref 79–97)
MONOCYTES # BLD AUTO: 0.8 10*3/MM3 (ref 0.1–0.9)
MONOCYTES NFR BLD AUTO: 5.8 % (ref 5–12)
NEUTROPHILS NFR BLD AUTO: 68.3 % (ref 42.7–76)
NEUTROPHILS NFR BLD AUTO: 9.43 10*3/MM3 (ref 1.7–7)
NRBC BLD AUTO-RTO: 0 /100 WBC (ref 0–0.2)
PLATELET # BLD AUTO: 248 10*3/MM3 (ref 140–450)
PMV BLD AUTO: 9.7 FL (ref 6–12)
POTASSIUM SERPL-SCNC: 4.3 MMOL/L (ref 3.5–5.2)
PROT SERPL-MCNC: 7.5 G/DL (ref 6–8.5)
RBC # BLD AUTO: 6.02 10*6/MM3 (ref 4.14–5.8)
RETICS # AUTO: 0.11 10*6/MM3 (ref 0.02–0.13)
RETICS/RBC NFR AUTO: 1.86 % (ref 0.7–1.9)
RHINOVIRUS RNA SPEC NAA+PROBE: NOT DETECTED
RSV RNA NPH QL NAA+NON-PROBE: NOT DETECTED
SARS-COV-2 RNA NPH QL NAA+NON-PROBE: NOT DETECTED
SODIUM SERPL-SCNC: 139 MMOL/L (ref 136–145)
T4 FREE SERPL-MCNC: 1.8 NG/DL (ref 0.93–1.7)
TIBC SERPL-MCNC: 453 MCG/DL (ref 298–536)
TRANSFERRIN SERPL-MCNC: 304 MG/DL (ref 200–360)
TSH SERPL DL<=0.05 MIU/L-ACNC: 0.04 UIU/ML (ref 0.27–4.2)
WBC NRBC COR # BLD: 13.78 10*3/MM3 (ref 3.4–10.8)

## 2023-02-08 PROCEDURE — 83615 LACTATE (LD) (LDH) ENZYME: CPT | Performed by: NURSE PRACTITIONER

## 2023-02-08 PROCEDURE — 85045 AUTOMATED RETICULOCYTE COUNT: CPT | Performed by: NURSE PRACTITIONER

## 2023-02-08 PROCEDURE — 82525 ASSAY OF COPPER: CPT | Performed by: NURSE PRACTITIONER

## 2023-02-08 PROCEDURE — 86038 ANTINUCLEAR ANTIBODIES: CPT | Performed by: NURSE PRACTITIONER

## 2023-02-08 PROCEDURE — 86364 TISS TRNSGLTMNASE EA IG CLAS: CPT | Performed by: NURSE PRACTITIONER

## 2023-02-08 PROCEDURE — 0202U NFCT DS 22 TRGT SARS-COV-2: CPT

## 2023-02-08 PROCEDURE — 82746 ASSAY OF FOLIC ACID SERUM: CPT | Performed by: NURSE PRACTITIONER

## 2023-02-08 PROCEDURE — 82607 VITAMIN B-12: CPT | Performed by: NURSE PRACTITIONER

## 2023-02-08 PROCEDURE — 82728 ASSAY OF FERRITIN: CPT | Performed by: NURSE PRACTITIONER

## 2023-02-08 PROCEDURE — 99205 OFFICE O/P NEW HI 60 MIN: CPT | Performed by: NURSE PRACTITIONER

## 2023-02-08 PROCEDURE — 84439 ASSAY OF FREE THYROXINE: CPT | Performed by: NURSE PRACTITIONER

## 2023-02-08 PROCEDURE — 84630 ASSAY OF ZINC: CPT | Performed by: NURSE PRACTITIONER

## 2023-02-08 PROCEDURE — 84466 ASSAY OF TRANSFERRIN: CPT | Performed by: NURSE PRACTITIONER

## 2023-02-08 PROCEDURE — 80050 GENERAL HEALTH PANEL: CPT | Performed by: NURSE PRACTITIONER

## 2023-02-08 PROCEDURE — 83540 ASSAY OF IRON: CPT | Performed by: NURSE PRACTITIONER

## 2023-02-08 PROCEDURE — G0432 EIA HIV-1/HIV-2 SCREEN: HCPCS | Performed by: NURSE PRACTITIONER

## 2023-02-08 PROCEDURE — 85652 RBC SED RATE AUTOMATED: CPT | Performed by: NURSE PRACTITIONER

## 2023-02-08 PROCEDURE — 86140 C-REACTIVE PROTEIN: CPT | Performed by: NURSE PRACTITIONER

## 2023-02-08 PROCEDURE — 86431 RHEUMATOID FACTOR QUANT: CPT | Performed by: NURSE PRACTITIONER

## 2023-02-08 PROCEDURE — 83010 ASSAY OF HAPTOGLOBIN QUANT: CPT | Performed by: NURSE PRACTITIONER

## 2023-02-08 PROCEDURE — 80074 ACUTE HEPATITIS PANEL: CPT | Performed by: NURSE PRACTITIONER

## 2023-02-08 NOTE — PROGRESS NOTES
Venipuncture Blood Specimen Collection  Venipuncture performed in left arm by Earline Griffin MA with good hemostasis. Patient tolerated the procedure well without complications.   02/08/23   Earline Griffin MA

## 2023-02-08 NOTE — PROGRESS NOTES
DATE OF CONSULTATION:  2/8/2023    REASON FOR REFERRAL: Leukocytosis, Erythrocytosis, KAREN    REFERRING PHYSICIAN:  Stephanie Green, *    CHIEF COMPLAINT:  Weakness, Fatigue; Headache related to sinus infection      HISTORY OF PRESENT ILLNESS:   Camilo Harper is a very pleasant 42 y.o. male who is being seen today at the request of Stephanie Green, * for evaluation and treatment of leukocytosis, erythrocytosis and KAREN. Mr. Harper reports following with his PCP every 3-6 months with lab testing. He reports that he has only recently been aware of the above issue. Previous available CBCs were reviewed and patient has had leukocytosis with WBC ranging 11.90-14.81 since at least March 2020. Erythrocytosis has been present since at least October 2021 and microcytosis is also noted since that time. Platelets have been within normal. Iron panel and Ferritin were obtained per PCP on 01/18/2023 and was consistent with KAREN. He was started on OTC iron supplement daily which he is tolerating well. He denies any history of blood transfusion. Denies any obvious blood loss from any source. He reports having EGD/colonoscopy per Dr. Ruiz within the last 5 years which was unremarkable. He denies any shortness of breath on exertion, chest pain or dizziness. He is a chronic, heavy smoker 1 PPD for the last 20 years. He reports a fair appetite, stable weight. Denies fever/chills or drenching night sweats. No tender/enlarged LNs. Denies any recurrent or difficult to treat infections. He reports today that since November he has had COVID, Flu, Acute Sinusitis and Stomach virus. He is currently being treated now for sinus infection. He has chronic pain in bilateral feet and left knee. He follows with orthopedics and podiatry and requires frequent steroid injections. Last steroid injection he received was in December 2022. His main complaint today is weakness/fatigue and headache related to sinus infection. He denies any other  specific complaints today.      PAST MEDICAL HISTORY:  Past Medical History:   Diagnosis Date   • Diabetes mellitus (HCC)    • Hypertension    • Thyroid disease        PAST SURGICAL HISTORY:  Past Surgical History:   Procedure Laterality Date   • VASECTOMY         FAMILY HISTORY:  Family History   Problem Relation Age of Onset   • Hypertension Father    • Heart disease Father    • Hypertension Mother    • Heart disease Mother    • Urolithiasis Maternal Uncle        SOCIAL HISTORY:  Social History     Socioeconomic History   • Marital status: Single   Tobacco Use   • Smoking status: Former     Packs/day: 2.00     Types: Cigarettes     Quit date:      Years since quittin.1   • Smokeless tobacco: Never   Vaping Use   • Vaping Use: Never used   Substance and Sexual Activity   • Alcohol use: No   • Drug use: No   • Sexual activity: Defer              MEDICATIONS:  The current medication list was reviewed in the EMR    Current Outpatient Medications:   •  aspirin 81 MG EC tablet, Take 81 mg by mouth Daily., Disp: , Rfl:   •  carvedilol (COREG) 6.25 MG tablet, 6.25 mg 2 (Two) Times a Day With Meals., Disp: , Rfl: 0  •  Diclofenac Sodium (VOLTAREN) 1 % gel gel, diclofenac 1 % topical gel  APPLY 2 GRAMS TOPICALLY TO THE AFFECTED AREA FOUR TIMES DAILY, Disp: , Rfl:   •  Empagliflozin (JARDIANCE PO), Take  by mouth Daily., Disp: , Rfl:   •  levothyroxine (SYNTHROID, LEVOTHROID) 175 MCG tablet, 150 mcg Daily., Disp: , Rfl: 0  •  metFORMIN (GLUCOPHAGE) 500 MG tablet, 1,000 mg 2 (Two) Times a Day With Meals., Disp: , Rfl: 0  •  pantoprazole (PROTONIX) 40 MG EC tablet, 40 mg Daily., Disp: , Rfl: 0  •  sertraline (ZOLOFT) 50 MG tablet, Take 1 tablet by mouth Daily., Disp: , Rfl:   •  vitamin B-12 (CYANOCOBALAMIN) 100 MCG tablet, Take 50 mcg by mouth Daily., Disp: , Rfl:     ALLERGIES:  No Known Allergies      REVIEW OF SYSTEMS:    A comprehensive 14 point review of systems was performed.  Significant findings as mentioned  "above.  All other systems reviewed and are negative.        Physical Exam   Vital Signs: /82   Pulse 90   Temp 97.3 °F (36.3 °C) (Temporal)   Resp 18   Ht 167.6 cm (66\")   Wt 85.7 kg (189 lb)   SpO2 98%   BMI 30.51 kg/m²        ECOG score: 0   General: Well developed, well nourished, alert and oriented x 3, in no acute distress.   Head: ATNC   Eyes: PERRL, No evidence of conjunctivitis.   Nose: No nasal discharge.   Mouth: Oral mucosal membranes moist. No oral ulceration or hemorrhages.   Neck: Neck supple. No thyromegaly. No JVD.   Lungs: Clear in all fields to A&P without rales, rhonchi or wheezing.   Heart: S1, S2. Regular rate and rhythm. No murmurs, rubs, or gallops.   Abdomen: Soft. Bowel sounds are normoactive. Nontender with palpation. No Hepatosplenomegaly can be appreciated.   Extremities: No clubbing, cyanosis or edema bilaterally.   Integumentary: Warm, dry, intact.   Hem/Lymph Nodes: No palpable cervical, submandibular, supraclavicular, axillary  lymphadenopathy noted. No petechiae, purpura or ecchymosis noted.   Neurologic: Grossly non-focal exam    Pain Score:  Pain Score    02/08/23 1336   PainSc:   5       PHQ-Score Total:  PHQ-9 Total Score: 0       PATHOLOGY:        ENDOSCOPY:        IMAGING:      RECENT LABS:  Lab Results   Component Value Date    WBC 12.55 (H) 01/18/2023    HGB 14.5 01/18/2023    HCT 46.7 01/18/2023    MCV 78.1 (L) 01/18/2023    RDW 14.7 01/18/2023     01/18/2023    NEUTRORELPCT 52.4 01/18/2023    LYMPHORELPCT 28.9 01/18/2023    MONORELPCT 7.8 01/18/2023    EOSRELPCT 10.1 (H) 01/18/2023    BASORELPCT 0.4 01/18/2023    NEUTROABS 6.57 01/18/2023    LYMPHSABS 3.63 (H) 01/18/2023       Lab Results   Component Value Date     12/17/2022    K 4.5 12/17/2022    CO2 23.0 12/17/2022     12/17/2022    BUN 7 12/17/2022    CREATININE 0.81 12/17/2022    EGFRIFNONA 104 12/25/2021    GLUCOSE 107 (H) 12/17/2022    CALCIUM 8.9 12/17/2022    ALKPHOS 98 12/17/2022    " AST 24 12/17/2022    ALT 24 12/17/2022    BILITOT 0.4 12/17/2022    ALBUMIN 4.20 12/17/2022    PROTEINTOT 6.8 12/17/2022           ASSESSMENT & PLAN:  Camilo Harper is a very pleasant 42 y.o. male with    1. Leukocytosis  2. Erythrocytosis  3. KAREN  - Previous available CBCs were reviewed and patient has had leukocytosis with WBC ranging 11.90-14.81 since at least March 2020. Erythrocytosis has been present since at least October 2021 and microcytosis is also noted since that time. Platelets have been within normal. Iron panel and Ferritin were obtained per PCP on 01/18/2023 and was consistent with KAREN.   - He was started on OTC iron supplement daily which he is tolerating well. He denies any history of blood transfusion. Denies any obvious blood loss from any source. He reports having EGD/colonoscopy per Dr. Ruiz within the last 5 years which was unremarkable (will request records today).   - He is a chronic, heavy smoker 1 PPD for the last 20 years. Clinically, he is doing well and denies any significant B-symptoms. He has chronic pain in bilateral feet and left knee. He follows with orthopedics and podiatry and requires frequent steroid injections. Last steroid injection he received was in December 2022.   - Based on history above smoking could be contributing to elevated WBC along with chronic underlying inflammation and medications (frequent steroid injections).   - Obtained repeat CBC today which showed ongoing leukocytosis and erythrocytosis with microcytosis. Platelets are within normal. Iron panel has normalized and Ferritin has improved. Advised to continue OTC oral iron daily.   - Sent additional labs today to further evaluate including B12, Folate, Retic, LDH, Haptoglobin, CRP, ESR, TSH, T4, Copper, Zinc, Tissue Transglutaminase, Acute Hepatitis panel, HIV panel, SANJAY and RF.  - Will follow up in 6 weeks with repeat labs and to discuss above pending work up. Will also obtain Flow Cytometry and BCR/ABL  at that time to evaluate for possible underlying myeloproliferative disorder.       ACO / SHILPA/Other  Quality measures  -  Camilo Harper did not receive 2022 flu vaccine. This was recommended, he declined.   -  Camilo Harper reports a pain score of 5.  Given his pain assessment as noted, treatment options were discussed and the following options were decided upon as a follow-up plan to address the patient's pain: continuation of current treatment plan for pain and referral to Primary Care for assistance in pain treatment guidance.  -  Current outpatient and discharge medications have been reconciled for the patient.  Reviewed by: HANNA Lieberman          The patient was in agreement with the plan and all questions were answered to his satisfaction.     Thank you so much for allowing us to participate in the care of Camilo Harper . Please do not hesitate to contact us with any questions or concerns.     A total of 45 minutes were spent coordinating this patient’s care in clinic today; more than 50% of this time was face-to-face with the patient, reviewing her interim medical history and counseling on the current treatment and followup plan. All questions were answered to her satisfaction.      Electronically Signed by: HANNA Srinivasan , February 8, 2023 13:37 EST           Electronically Signed by: HANNA Srinivasan , February 8, 2023 13:37 EST       CC:   Stephanie Green, *  Stephanie Green APRN

## 2023-02-09 LAB
ANA SER QL: NEGATIVE
CHROMATIN AB SERPL-ACNC: <10 IU/ML (ref 0–14)
FOLATE SERPL-MCNC: 19.3 NG/ML (ref 4.78–24.2)
HAPTOGLOB SERPL-MCNC: 311 MG/DL (ref 30–200)
TTG IGA SER-ACNC: <2 U/ML (ref 0–3)
VIT B12 BLD-MCNC: 1271 PG/ML (ref 211–946)

## 2023-02-09 NOTE — PROGRESS NOTES
Notified patient of low TSH and elevated T4. He is currently on Synthroid 150 mcg per PCP. Advised patient to follow up with PCP.    Sharee Chappell, APRN   02/09/2023  0941 am

## 2023-02-10 LAB — REF LAB TEST METHOD: NORMAL

## 2023-02-11 LAB — ZINC SERPL-MCNC: 79 UG/DL (ref 44–115)

## 2023-02-12 LAB — COPPER SERPL-MCNC: 100 UG/DL (ref 69–132)

## 2023-02-13 ENCOUNTER — PATIENT ROUNDING (BHMG ONLY) (OUTPATIENT)
Dept: ONCOLOGY | Facility: CLINIC | Age: 43
End: 2023-02-13
Payer: COMMERCIAL

## 2023-02-15 ENCOUNTER — TELEPHONE (OUTPATIENT)
Dept: ORTHOPEDIC SURGERY | Facility: CLINIC | Age: 43
End: 2023-02-15
Payer: COMMERCIAL

## 2023-02-15 NOTE — TELEPHONE ENCOUNTER
Provider: DR SIMON     Caller: JAYJAY THOMAS     Relationship to Patient: SELF     Phone Number: [phone]    Reason for Call: PATIENT CALLED AND STATED THAT HIS LAST INJECTION HE RECEIVED WORE OFF AFTER A MONTH AND THE PAIN CAME BACK PLEASE ADVISE

## 2023-02-16 NOTE — TELEPHONE ENCOUNTER
Returned patients call. Scheduled patient to come in to discuss the next steps with Dr. Castro. Patient verbalized understanding.

## 2023-03-15 ENCOUNTER — OFFICE VISIT (OUTPATIENT)
Dept: ORTHOPEDIC SURGERY | Facility: CLINIC | Age: 43
End: 2023-03-15
Payer: COMMERCIAL

## 2023-03-15 ENCOUNTER — LAB (OUTPATIENT)
Dept: ONCOLOGY | Facility: CLINIC | Age: 43
End: 2023-03-15
Payer: COMMERCIAL

## 2023-03-15 ENCOUNTER — HOSPITAL ENCOUNTER (OUTPATIENT)
Dept: GENERAL RADIOLOGY | Facility: HOSPITAL | Age: 43
Discharge: HOME | End: 2023-03-15
Admitting: ORTHOPAEDIC SURGERY
Payer: COMMERCIAL

## 2023-03-15 ENCOUNTER — OFFICE VISIT (OUTPATIENT)
Dept: ONCOLOGY | Facility: CLINIC | Age: 43
End: 2023-03-15
Payer: COMMERCIAL

## 2023-03-15 VITALS — WEIGHT: 191 LBS | BODY MASS INDEX: 30.7 KG/M2 | HEIGHT: 66 IN

## 2023-03-15 VITALS
OXYGEN SATURATION: 98 % | WEIGHT: 191.2 LBS | HEIGHT: 66 IN | SYSTOLIC BLOOD PRESSURE: 125 MMHG | DIASTOLIC BLOOD PRESSURE: 77 MMHG | TEMPERATURE: 97.5 F | HEART RATE: 83 BPM | RESPIRATION RATE: 18 BRPM | BODY MASS INDEX: 30.73 KG/M2

## 2023-03-15 DIAGNOSIS — D50.9 MICROCYTIC ANEMIA: ICD-10-CM

## 2023-03-15 DIAGNOSIS — D72.829 LEUKOCYTOSIS, UNSPECIFIED TYPE: ICD-10-CM

## 2023-03-15 DIAGNOSIS — D50.9 MICROCYTIC ANEMIA: Primary | ICD-10-CM

## 2023-03-15 DIAGNOSIS — M25.551 RIGHT HIP PAIN: ICD-10-CM

## 2023-03-15 DIAGNOSIS — D75.1 ERYTHROCYTOSIS: ICD-10-CM

## 2023-03-15 DIAGNOSIS — D50.9 IRON DEFICIENCY ANEMIA, UNSPECIFIED IRON DEFICIENCY ANEMIA TYPE: ICD-10-CM

## 2023-03-15 DIAGNOSIS — M25.562 LEFT KNEE PAIN, UNSPECIFIED CHRONICITY: Primary | ICD-10-CM

## 2023-03-15 DIAGNOSIS — M25.551 RIGHT HIP PAIN: Primary | ICD-10-CM

## 2023-03-15 LAB
BASOPHILS # BLD AUTO: 0.07 10*3/MM3 (ref 0–0.2)
BASOPHILS NFR BLD AUTO: 0.6 % (ref 0–1.5)
DEPRECATED RDW RBC AUTO: 45.9 FL (ref 37–54)
EOSINOPHIL # BLD AUTO: 0.42 10*3/MM3 (ref 0–0.4)
EOSINOPHIL NFR BLD AUTO: 3.7 % (ref 0.3–6.2)
ERYTHROCYTE [DISTWIDTH] IN BLOOD BY AUTOMATED COUNT: 17.3 % (ref 12.3–15.4)
FERRITIN SERPL-MCNC: 18.35 NG/ML (ref 30–400)
HCT VFR BLD AUTO: 50.4 % (ref 37.5–51)
HGB BLD-MCNC: 16.4 G/DL (ref 13–17.7)
IMM GRANULOCYTES # BLD AUTO: 0.06 10*3/MM3 (ref 0–0.05)
IMM GRANULOCYTES NFR BLD AUTO: 0.5 % (ref 0–0.5)
IRON 24H UR-MRATE: 206 MCG/DL (ref 59–158)
IRON SATN MFR SERPL: 44 % (ref 20–50)
LYMPHOCYTES # BLD AUTO: 2.85 10*3/MM3 (ref 0.7–3.1)
LYMPHOCYTES NFR BLD AUTO: 24.9 % (ref 19.6–45.3)
MCH RBC QN AUTO: 26.1 PG (ref 26.6–33)
MCHC RBC AUTO-ENTMCNC: 32.5 G/DL (ref 31.5–35.7)
MCV RBC AUTO: 80.1 FL (ref 79–97)
MONOCYTES # BLD AUTO: 0.82 10*3/MM3 (ref 0.1–0.9)
MONOCYTES NFR BLD AUTO: 7.2 % (ref 5–12)
NEUTROPHILS NFR BLD AUTO: 63.1 % (ref 42.7–76)
NEUTROPHILS NFR BLD AUTO: 7.21 10*3/MM3 (ref 1.7–7)
NRBC BLD AUTO-RTO: 0 /100 WBC (ref 0–0.2)
PLATELET # BLD AUTO: 225 10*3/MM3 (ref 140–450)
PMV BLD AUTO: 10.1 FL (ref 6–12)
RBC # BLD AUTO: 6.29 10*6/MM3 (ref 4.14–5.8)
TIBC SERPL-MCNC: 465 MCG/DL (ref 298–536)
TRANSFERRIN SERPL-MCNC: 312 MG/DL (ref 200–360)
WBC NRBC COR # BLD: 11.43 10*3/MM3 (ref 3.4–10.8)

## 2023-03-15 PROCEDURE — 99214 OFFICE O/P EST MOD 30 MIN: CPT | Performed by: NURSE PRACTITIONER

## 2023-03-15 PROCEDURE — 99214 OFFICE O/P EST MOD 30 MIN: CPT | Performed by: ORTHOPAEDIC SURGERY

## 2023-03-15 PROCEDURE — 81206 BCR/ABL1 GENE MAJOR BP: CPT

## 2023-03-15 PROCEDURE — 73502 X-RAY EXAM HIP UNI 2-3 VIEWS: CPT | Performed by: RADIOLOGY

## 2023-03-15 PROCEDURE — 84466 ASSAY OF TRANSFERRIN: CPT | Performed by: NURSE PRACTITIONER

## 2023-03-15 PROCEDURE — 73502 X-RAY EXAM HIP UNI 2-3 VIEWS: CPT

## 2023-03-15 PROCEDURE — 82728 ASSAY OF FERRITIN: CPT | Performed by: NURSE PRACTITIONER

## 2023-03-15 PROCEDURE — 85025 COMPLETE CBC W/AUTO DIFF WBC: CPT | Performed by: NURSE PRACTITIONER

## 2023-03-15 PROCEDURE — 83540 ASSAY OF IRON: CPT | Performed by: NURSE PRACTITIONER

## 2023-03-15 PROCEDURE — 81207 BCR/ABL1 GENE MINOR BP: CPT

## 2023-03-15 RX ORDER — METHYLPREDNISOLONE 4 MG/1
TABLET ORAL
Qty: 21 TABLET | Refills: 0 | Status: SHIPPED | OUTPATIENT
Start: 2023-03-15 | End: 2023-04-05

## 2023-03-15 NOTE — PROGRESS NOTES
DATE:  3/15/2023    DIAGNOSIS: Leukocytosis, Erythrocytosis, KAREN    CHIEF COMPLAINT:  Pain left hip/knee    TREATMENT HISTORY:  OTC Iron Supplement daily    HISTORY OF PRESENT ILLNESS:   Camilo Harper is a very pleasant 42 y.o. male who is being seen today at the request of Stephanie Green, * for evaluation and treatment of leukocytosis, erythrocytosis and KAREN. Mr. Harper reports following with his PCP every 3-6 months with lab testing. He reports that he has only recently been aware of the above issue. Previous available CBCs were reviewed and patient has had leukocytosis with WBC ranging 11.90-14.81 since at least March 2020. Erythrocytosis has been present since at least October 2021 and microcytosis is also noted since that time. Platelets have been within normal. Iron panel and Ferritin were obtained per PCP on 01/18/2023 and was consistent with KAREN. He was started on OTC iron supplement daily which he is tolerating well. He denies any history of blood transfusion. Denies any obvious blood loss from any source. He reports having EGD/colonoscopy per Dr. Ruiz within the last 5 years which was unremarkable. He denies any shortness of breath on exertion, chest pain or dizziness. He is a chronic, heavy smoker 1 PPD for the last 20 years. He reports a fair appetite, stable weight. Denies fever/chills or drenching night sweats. No tender/enlarged LNs. Denies any recurrent or difficult to treat infections. He reports today that since November he has had COVID, Flu, Acute Sinusitis and Stomach virus. He is currently being treated now for sinus infection. He has chronic pain in bilateral feet and left knee. He follows with orthopedics and podiatry and requires frequent steroid injections. Last steroid injection he received was in December 2022. His main complaint today is weakness/fatigue and headache related to sinus infection. He denies any other specific complaints today.         Interval History:  Mr. Harper  presents today for follow up. At his last visit, he was diagnosed with COVID and has recovered well from this. However, today he reports that he is struggling with left hip and knee pain. He has appointment this afternoon with Dr. Calhoun to further discuss. He last received steroid injection in 2022. He continues to smoke 1 PPD. He denies fever/chills or drenching night sweats. He reports a good appetite, stable weight. He denies any infectious symptoms today. He continues to take an OTC iron supplement daily and is tolerating this well. He denies any other complaints today.     The following portions of the patient's history were reviewed and updated as appropriate: allergies, current medications, past family history, past medical history, past social history, past surgical history and problem list.    PAST MEDICAL HISTORY:  Past Medical History:   Diagnosis Date   • Diabetes mellitus (HCC)    • Hypertension    • Thyroid disease        PAST SURGICAL HISTORY:  Past Surgical History:   Procedure Laterality Date   • VASECTOMY         SOCIAL HISTORY:  Social History     Socioeconomic History   • Marital status: Single   Tobacco Use   • Smoking status: Former     Packs/day: 2.00     Types: Cigarettes     Quit date:      Years since quittin.2   • Smokeless tobacco: Never   Vaping Use   • Vaping Use: Never used   Substance and Sexual Activity   • Alcohol use: No   • Drug use: No   • Sexual activity: Defer           FAMILY HISTORY:  Family History   Problem Relation Age of Onset   • Hypertension Father    • Heart disease Father    • Hypertension Mother    • Heart disease Mother    • Urolithiasis Maternal Uncle          MEDICATIONS:  The current medication list was reviewed in the EMR    Current Outpatient Medications:   •  aspirin 81 MG EC tablet, Take 1 tablet by mouth Daily., Disp: , Rfl:   •  carvedilol (COREG) 6.25 MG tablet, 1 tablet 2 (Two) Times a Day With Meals., Disp: , Rfl: 0  •  Empagliflozin  (JARDIANCE PO), Take  by mouth Daily., Disp: , Rfl:   •  levothyroxine (SYNTHROID, LEVOTHROID) 175 MCG tablet, 150 mcg Daily., Disp: , Rfl: 0  •  metFORMIN (GLUCOPHAGE) 500 MG tablet, 2 tablets 2 (Two) Times a Day With Meals., Disp: , Rfl: 0  •  pantoprazole (PROTONIX) 40 MG EC tablet, 1 tablet Daily., Disp: , Rfl: 0  •  sertraline (ZOLOFT) 50 MG tablet, Take 1 tablet by mouth Daily., Disp: , Rfl:   •  Diclofenac Sodium (VOLTAREN) 1 % gel gel, diclofenac 1 % topical gel  APPLY 2 GRAMS TOPICALLY TO THE AFFECTED AREA FOUR TIMES DAILY (Patient not taking: Reported on 3/15/2023), Disp: , Rfl:   •  vitamin B-12 (CYANOCOBALAMIN) 100 MCG tablet, Take 50 mcg by mouth Daily. (Patient not taking: Reported on 3/15/2023), Disp: , Rfl:     ALLERGIES:  No Known Allergies      REVIEW OF SYSTEMS:    A comprehensive 14 point review of systems was performed.  Significant findings as mentioned above.  All other systems reviewed and are negative.        Physical Exam   Vital Signs:   Vitals:    03/15/23 1247   BP: 125/77   Pulse: 83   Resp: 18   Temp: 97.5 °F (36.4 °C)   SpO2: 98%      ECOG score: 0   General: Well developed, well nourished, alert and oriented x 3, in no acute distress.   Head: ATNC   Eyes: PERRL, No evidence of conjunctivitis.   Nose: No nasal discharge.   Mouth: Oral mucosal membranes moist. No oral ulceration or hemorrhages.   Neck: Neck supple. No thyromegaly. No JVD.   Lungs: Clear in all fields to A&P without rales, rhonchi or wheezing.   Heart: S1, S2. Regular rate and rhythm. No murmurs, rubs, or gallops.   Abdomen: Soft. Bowel sounds are normoactive. Nontender with palpation. No Hepatosplenomegaly can be appreciated.   Extremities: No clubbing, cyanosis or edema bilaterally.   Integumentary: Warm, dry, intact.  Lymph Nodes: No palpable cervical, submandibular, supraclavicular, axillary lymphadenopathy noted. No petechiae, purpura or ecchymosis noted.   Neurologic: Grossly non-focal exam.    Pain Score:  Pain  Score    03/15/23 1247   PainSc:   8   PainLoc: Hip       PHQ-Score Total:  PHQ-9 Total Score: 0       PATHOLOGY:        ENDOSCOPY:        IMAGING:  CT AP with Contrast 10/22/2021   FINDINGS:  Lower thorax: Clear. No effusions.  Abdomen:  Liver: Homogeneous. No focal hepatic mass or ductal dilatation.  Gallbladder: No dilation or stone identified.  Pancreas: Unremarkable. No mass or ductal dilatation.  Spleen: Homogeneous. No splenomegaly.  Adrenals: No mass.  Kidneys/ureters: No mass. No obstructive uropathy.  No evidence of  urolithiasis.  GI tract: Sigmoid colon wall thickening also descending colon wall  thickening. There is no evidence of appendicitis  MESENTERY: No free fluid, walled off fluid collections, mesenteric  stranding, or enlarged lymph nodes  Vasculature: No evidence of aneurysm.  Abdominal wall: No focal hernia or mass.  Bladder: No focal mass or significant wall thickening  Reproductive: Unremarkable as visualized  Bones: No acute bony abnormality.     IMPRESSION:     1. Appearance concerning for left-sided colitis     2.Other findings as above     3. moderate to large volume stool            RECENT LABS:  Lab Results   Component Value Date    WBC 11.43 (H) 03/15/2023    HGB 16.4 03/15/2023    HCT 50.4 03/15/2023    MCV 80.1 03/15/2023    RDW 17.3 (H) 03/15/2023     03/15/2023    NEUTRORELPCT 63.1 03/15/2023    LYMPHORELPCT 24.9 03/15/2023    MONORELPCT 7.2 03/15/2023    EOSRELPCT 3.7 03/15/2023    BASORELPCT 0.6 03/15/2023    NEUTROABS 7.21 (H) 03/15/2023    LYMPHSABS 2.85 03/15/2023       Lab Results   Component Value Date     02/08/2023    K 4.3 02/08/2023    CO2 25.8 02/08/2023     02/08/2023    BUN 7 02/08/2023    CREATININE 0.80 02/08/2023    EGFRIFNONA 104 12/25/2021    GLUCOSE 109 (H) 02/08/2023    CALCIUM 9.2 02/08/2023    ALKPHOS 95 02/08/2023    AST 17 02/08/2023    ALT 18 02/08/2023    BILITOT 0.5 02/08/2023    ALBUMIN 4.3 02/08/2023    PROTEINTOT 7.5 02/08/2023        Lab Results   Component Value Date     02/08/2023       Lab Results   Component Value Date    FERRITIN 21.68 (L) 02/08/2023    IRON 110 02/08/2023    TIBC 453 02/08/2023    LABIRON 24 02/08/2023    JOWOLOFM29 1,271 (H) 02/08/2023    FOLATE 19.30 02/08/2023    HAPTOGLOBIN 311 (H) 02/08/2023    RETICCTPCT 1.86 02/08/2023    RETIC 0.1120 02/08/2023      Work Up 02/08/2023    C-Reactive Protein  0.00 - 0.50 mg/dL 3.31 High      Sed Rate  0 - 15 mm/hr 21 High        TSH  0.270 - 4.200 uIU/mL 0.045 Low      Copper  69 - 132 ug/dL 100      Zinc  44 - 115 ug/dL 79      Tissue Transglutaminase IgA  0 - 3 U/mL <2        Hepatitis B Surface Ag  Non-Reactive Non-Reactive    Hep A IgM  Non-Reactive Non-Reactive    Hep B C IgM  Non-Reactive Non-Reactive    Hepatitis C Ab  Non-Reactive Non-Reactive      HIV-1/ HIV-2  Non-Reactive Non-Reactive      SANJAY Direct  Negative Negative      Rheumatoid Factor Quantitative  0.0 - 14.0 IU/mL <10.0      Free T4  0.93 - 1.70 ng/dL 1.80          ASSESSMENT & PLAN:  Camilo Harper is a very pleasant 42 y.o. male with    1.  Leukocytosis  2. Erythrocytosis  3. KAREN  - Previous available CBCs were reviewed and patient has had leukocytosis with WBC ranging 11.90-14.81 since at least March 2020. Erythrocytosis has been present since at least October 2021 and microcytosis is also noted since that time. Platelets have been within normal. Iron panel and Ferritin were obtained per PCP on 01/18/2023 and was consistent with KAREN.   - He was started on OTC iron supplement daily which he is tolerating well. He denies any history of blood transfusion. Denies any obvious blood loss from any source. He reports having EGD/colonoscopy per Dr. Ruiz within the last 5 years which was unremarkable (will request records today).   - He is a chronic, heavy smoker 1 PPD for the last 20 years. Clinically, he is doing well and denies any significant B-symptoms. He has chronic pain in bilateral feet and left  knee. He follows with orthopedics and podiatry and requires frequent steroid injections. Last steroid injection he received was in December 2022.   - Based on history above smoking could be contributing to elevated WBC along with chronic underlying inflammation and medications (frequent steroid injections).   - CBC from initial consultation showed ongoing leukocytosis and erythrocytosis with microcytosis. Platelets are within normal. Iron panel has normalized and Ferritin has improved. B12 and Folate were replete. PBS was obtained and showed neutrophilic leukocytosis without significant left shift, no abnormal or immature WBCs noted, microcytic red blood cells without anemia or significant anisopoikilocytosis, platelets present in adequate numbers with normal apperance. No evidence of hemolysis as Retic and LDH were normal with elevated Haptoglobin. CRP and ESR were elevated and suggestive of chronic underlying inflammation. TSH was low with elevated T4 and recommended to follow up with PCP. Copper, Zinc and Tissue Transglutaminase were normal.  Acute Hepatitis panel, HIV panel, SANJAY and RF were negative.  - Repeat CBC from today shows improved but ongoing leukocytosis (11.43) with erythrocytosis and resolved microcytosis. Iron is elevated with low Ferritin. Will discontinue oral iron supplement and repeat labs in 3 months. Flow Cytometry and BCR/ABL are pending at this time to evaluate for possible underlying myeloproliferative disorder.   - Will follow up in 3 months with repeat labs. Will follow up with above pending lab work.   - In the meantime, strongly advised patient to cut back and quit smoking and he is not interested.       ACO / SHILPA/Other  Quality measures  -  Camilo Harper did not receive 2022 flu vaccine. This was recommended, he declined.  -  Camilo Harper reports a pain score of 8.  Given his pain assessment as noted, treatment options were discussed and the following options were decided upon as  a follow-up plan to address the patient's pain: continuation of current treatment plan for pain and follow up with ortho as planned today.  -  Current outpatient and discharge medications have been reconciled for the patient.  Reviewed by: HANNA Lieberman                          A total of 30 minutes were spent coordinating this patient’s care in clinic today; more than 50% of this time was face-to-face with the patient, reviewing his interim medical history and counseling on the current treatment and followup plan. All questions were answered to his satisfaction.          Electronically Signed by: HANNA Srinivasan APRN March 15, 2023 13:00 EDT       CC:   No ref. provider found  Stephanie Green APRN

## 2023-03-15 NOTE — PROGRESS NOTES
Follow-up Visit         Patient: Camilo Harper  YOB: 1980  Date of Encounter: 03/15/2023      Chief  Complaint:   Chief Complaint   Patient presents with   • Left Knee - Follow-up, Pain   • Right Hip - Pain     New-Problem           HPI:  Camilo Harper, 42 y.o. male presents in follow-up primary complaint of left knee pain.  His symptoms began to fall he landed directly on his left knee struck the curb.  Since then he has had pain and a popping sensation as well as significant pain going up and down steps.  He has received intra-articular steroid injection describes a months relief was provided viscous injection left knee and also had a months relief.  Second complaint today is anterior right hip pain of a few weeks duration he has had no trauma he thinks it may be from pain on his left knee.  He localizes pain anteriorly but denies pain deep within the groin.  He has no history of previous right hip problems.  He denies weakness or numbness to his right leg.  His social history is negative for tobacco.  His medical history includes diabetes.        Medical History:  Patient Active Problem List   Diagnosis   • Cough   • Dizziness   • Abnormal EKG   • Essential hypertension   • Type 2 diabetes mellitus without complication, without long-term current use of insulin (HCC)   • Family history of premature coronary artery disease   • Precordial pain   • Primary osteoarthritis of left knee     Past Medical History:   Diagnosis Date   • Diabetes mellitus (HCC)    • Hypertension    • Thyroid disease          Social History:  Social History     Socioeconomic History   • Marital status: Single   Tobacco Use   • Smoking status: Former     Packs/day: 2.00     Types: Cigarettes     Quit date: 2017     Years since quittin.2   • Smokeless tobacco: Never   Vaping Use   • Vaping Use: Never used   Substance and Sexual Activity   • Alcohol use: No   • Drug use: No   • Sexual activity: Defer         Current  Medications:    Current Outpatient Medications:   •  aspirin 81 MG EC tablet, Take 1 tablet by mouth Daily., Disp: , Rfl:   •  carvedilol (COREG) 6.25 MG tablet, 1 tablet 2 (Two) Times a Day With Meals., Disp: , Rfl: 0  •  Diclofenac Sodium (VOLTAREN) 1 % gel gel, , Disp: , Rfl:   •  Empagliflozin (JARDIANCE PO), Take  by mouth Daily., Disp: , Rfl:   •  levothyroxine (SYNTHROID, LEVOTHROID) 175 MCG tablet, 150 mcg Daily., Disp: , Rfl: 0  •  metFORMIN (GLUCOPHAGE) 500 MG tablet, 2 tablets 2 (Two) Times a Day With Meals., Disp: , Rfl: 0  •  pantoprazole (PROTONIX) 40 MG EC tablet, 1 tablet Daily., Disp: , Rfl: 0  •  sertraline (ZOLOFT) 50 MG tablet, Take 1 tablet by mouth Daily., Disp: , Rfl:   •  vitamin B-12 (CYANOCOBALAMIN) 100 MCG tablet, Take 50 mcg by mouth Daily., Disp: , Rfl:   •  methylPREDNISolone (MEDROL) 4 MG dose pack, Use as directed by package instructions, Disp: 21 tablet, Rfl: 0      Allergies:  No Known Allergies      Family History:  Family History   Problem Relation Age of Onset   • Hypertension Father    • Heart disease Father    • Hypertension Mother    • Heart disease Mother    • Urolithiasis Maternal Uncle          Surgical History:  Past Surgical History:   Procedure Laterality Date   • VASECTOMY           Radiology:   XR Hip With or Without Pelvis 2 - 3 View Right    Result Date: 3/15/2023    No acute findings in the right hip.  This report was finalized on 3/15/2023 4:13 PM by Dr. Heath Paul MD.          Radiographs right hip obtained are unremarkable including AP of the pelvis.    MRI left knee again reviewed.  There is description of medial meniscus tear extending to the periarticular surface.  By my review is minimal signal abnormality within the medial meniscus.      Orthopedic Examination:   Left knee demonstrates no effusion.  He has crepitance at 30 degrees with flexion and extension and mild pain associated.  He demonstrates no medial or lateral joint line tenderness Yana's is  negative no gross instability with varus valgus stressing Lachman or drawer.    Right hip evaluation shows full mobility compared to the left he has neutral external rotation and 40 degrees of internal rotation bilaterally but he is moderately uncomfortable with rotation of his right hip and is guarded.  He demonstrates no localized tenderness anteriorly with deep palpation from the anterior superior iliac spine distally.  Straight leg raising is negative.  Neurovascular exam grossly intact.      Assessment & Plan:   42 y.o. male presents chronic problems left knee and possibly articular cartilage defect involving the medial femoral condyle we reviewed MRI and there is mild abnormality within the patellofemoral joint.  The medial meniscus findings are unimpressive.  Regarding his right hip certainly could be from overuse.  Today he is provided prednisone Dosepak he at this point is unable to attend physical therapy due to working long days.  We will follow-up in approximately 10 days and we will assess his response.         Diagnosis Plan   1. Left knee pain, unspecified chronicity        2. Right hip pain  methylPREDNISolone (MEDROL) 4 MG dose pack                  Cc:  Stephanie Green, HANNA              This document has been electronically signed by Cedric Castro MD   March 21, 2023 00:30 EDT

## 2023-03-15 NOTE — PROGRESS NOTES
Venipuncture Blood Specimen Collection  Venipuncture performed in left arm by Robert Pope MA with good hemostasis. Patient tolerated the procedure well without complications.   03/15/23   Robert Pope MA

## 2023-03-17 LAB — Lab: NORMAL

## 2023-03-22 LAB
INTERPRETATION: NEGATIVE
LAB DIRECTOR NAME PROVIDER: NORMAL
LABORATORY COMMENT REPORT: NORMAL
REF LAB TEST METHOD: NORMAL
T(ABL1,BCR)B2A2/CONTROL BLD/T: NORMAL %
T(ABL1,BCR)B3A2/CONTROL BLD/T: NORMAL %
T(ABL1,BCR)E1A2/CONTROL BLD/T: NORMAL %

## 2023-03-27 ENCOUNTER — OFFICE VISIT (OUTPATIENT)
Dept: ORTHOPEDIC SURGERY | Facility: CLINIC | Age: 43
End: 2023-03-27
Payer: COMMERCIAL

## 2023-03-27 VITALS
SYSTOLIC BLOOD PRESSURE: 121 MMHG | HEIGHT: 66 IN | BODY MASS INDEX: 30.68 KG/M2 | DIASTOLIC BLOOD PRESSURE: 83 MMHG | HEART RATE: 86 BPM | WEIGHT: 190.92 LBS

## 2023-03-27 DIAGNOSIS — S83.241D ACUTE MEDIAL MENISCUS TEAR OF RIGHT KNEE, SUBSEQUENT ENCOUNTER: Primary | ICD-10-CM

## 2023-03-27 DIAGNOSIS — M22.42 CHONDROMALACIA OF LEFT PATELLA: ICD-10-CM

## 2023-03-27 DIAGNOSIS — M25.562 ACUTE PAIN OF LEFT KNEE: ICD-10-CM

## 2023-03-27 PROCEDURE — 99214 OFFICE O/P EST MOD 30 MIN: CPT | Performed by: ORTHOPAEDIC SURGERY

## 2023-03-27 NOTE — PROGRESS NOTES
History and Physical      Patient: Camilo Harper  YOB: 1980  Date of Encounter: 2023      Chief Complaint:   Chief Complaint   Patient presents with   • Left Knee - Pain, Follow-up           HPI:   Camilo Harper, 42 y.o. male, presents in follow-up with continued left knee pain.  He presents today wishing to discuss surgical option for his left knee continues to struggle with pain especially ascending and descending steps feels a popping sensation which usually relieves his pain he describes it as a grinding sensation going up and down steps.  His symptoms began 2022.  Fell landing directly on his left knee.  Symptoms of left knee pain prior to this event he continues to struggle with left knee pain since which has not improved with intra-articular steroid injections.  He has had MRI obtained in the past which identified medial meniscus tear.        Active Problem List:  Patient Active Problem List   Diagnosis   • Cough   • Dizziness   • Abnormal EKG   • Essential hypertension   • Type 2 diabetes mellitus without complication, without long-term current use of insulin (HCC)   • Family history of premature coronary artery disease   • Precordial pain   • Primary osteoarthritis of left knee           Past Medical History:  Past Medical History:   Diagnosis Date   • Diabetes mellitus (HCC)    • Hypertension    • Thyroid disease            Past Surgical History:  Past Surgical History:   Procedure Laterality Date   • VASECTOMY             Family History:  Family History   Problem Relation Age of Onset   • Hypertension Father    • Heart disease Father    • Hypertension Mother    • Heart disease Mother    • Urolithiasis Maternal Uncle            Social History:  Social History     Socioeconomic History   • Marital status: Single   Tobacco Use   • Smoking status: Former     Packs/day: 2.00     Types: Cigarettes     Quit date: 2017     Years since quittin.2   • Smokeless tobacco: Never    Vaping Use   • Vaping Use: Never used   Substance and Sexual Activity   • Alcohol use: No   • Drug use: No   • Sexual activity: Defer     Body mass index is 30.83 kg/m².        Medications:  Current Outpatient Medications   Medication Sig Dispense Refill   • aspirin 81 MG EC tablet Take 1 tablet by mouth Daily.     • carvedilol (COREG) 6.25 MG tablet 1 tablet 2 (Two) Times a Day With Meals.  0   • Empagliflozin (JARDIANCE PO) Take  by mouth Daily.     • levothyroxine (SYNTHROID, LEVOTHROID) 175 MCG tablet 150 mcg Daily.  0   • metFORMIN (GLUCOPHAGE) 500 MG tablet 2 tablets 2 (Two) Times a Day With Meals.  0   • pantoprazole (PROTONIX) 40 MG EC tablet 1 tablet Daily.  0   • sertraline (ZOLOFT) 50 MG tablet Take 1 tablet by mouth Daily.     • Diclofenac Sodium (VOLTAREN) 1 % gel gel      • methylPREDNISolone (MEDROL) 4 MG dose pack Use as directed by package instructions 21 tablet 0   • vitamin B-12 (CYANOCOBALAMIN) 100 MCG tablet Take 50 mcg by mouth Daily.       No current facility-administered medications for this visit.           Allergies:  No Known Allergies        Review of Systems:   Review of Systems        Physical Exam:   Physical Exam  Vitals and nursing note reviewed.   Constitutional:       General: He is not in acute distress.     Appearance: He is not diaphoretic.   HENT:      Head: Normocephalic and atraumatic.      Right Ear: External ear normal.      Left Ear: External ear normal.   Eyes:      General:         Right eye: No discharge.         Left eye: No discharge.      Conjunctiva/sclera: Conjunctivae normal.   Cardiovascular:      Rate and Rhythm: Normal rate and regular rhythm.      Heart sounds: Normal heart sounds. No murmur heard.  Pulmonary:      Effort: Pulmonary effort is normal. No respiratory distress.      Breath sounds: Normal breath sounds. No wheezing.   Abdominal:      General: There is no distension.      Palpations: Abdomen is soft.      Tenderness: There is no guarding.  "  Musculoskeletal:      Cervical back: Normal range of motion and neck supple.   Skin:     General: Skin is warm and dry.      Capillary Refill: Capillary refill takes less than 2 seconds.   Neurological:      Mental Status: He is alert and oriented to person, place, and time.   Psychiatric:         Behavior: Behavior normal.         Thought Content: Thought content normal.         Judgment: Judgment normal.       GENERAL: 42 y.o. male, alert and oriented X 3 in no acute distress.   Visit Vitals  /83   Pulse 86   Ht 167.6 cm (65.98\")   Wt 86.6 kg (190 lb 14.7 oz)   BMI 30.83 kg/m²           Musculoskeletal Examination: Left knee reveals minimal medial joint line tenderness no lateral joint line tenderness.  He has no gross instability with varus valgus stressing Lachman or drawer he demonstrates full flexion and extension.  At approximately 30 degrees of flexion he has crepitance and associated pain with compression of the patella.  He demonstrates normal patellar tracking with normal neurovascular status.      Radiology/Labs:    XR Hip With or Without Pelvis 2 - 3 View Right    Result Date: 3/15/2023    No acute findings in the right hip.  This report was finalized on 3/15/2023 4:13 PM by Dr. Heath Paul MD.          Radiographs left knee show tach medial lateral joint space.   small osteophyte off the superior and inferior poles of the patella.        Assessment & Plan:   42 y.o. male presents in follow-up left knee injury sustained when he fell and landed on a curb since then he struggled with knee pain has benefited from intra-articular steroid injection x4 weeks and also from viscous injection left knee x4 weeks.  Presentation is concerning for medial meniscus tear but also for malacia patella.  We discussed his options at length.  He wishes to proceed with left knee arthroscopy possible partial medial meniscectomy also possible chondroplasty with drilling of the patella depending on intraoperative " findings.  Surgery is scheduled April 6, 2023.    No diagnosis found.        Cc:   Stephanie Green, APRN              This document has been electronically signed by Cedric Castro MD   March 27, 2023 14:28 EDT

## 2023-03-30 PROBLEM — S83.241A ACUTE MEDIAL MENISCUS TEAR OF RIGHT KNEE: Status: ACTIVE | Noted: 2023-03-30

## 2023-04-04 NOTE — DISCHARGE INSTRUCTIONS

## 2023-04-05 ENCOUNTER — PRE-ADMISSION TESTING (OUTPATIENT)
Dept: PREADMISSION TESTING | Facility: HOSPITAL | Age: 43
End: 2023-04-05
Payer: COMMERCIAL

## 2023-04-05 DIAGNOSIS — S83.241D ACUTE MEDIAL MENISCUS TEAR OF RIGHT KNEE, SUBSEQUENT ENCOUNTER: ICD-10-CM

## 2023-04-05 DIAGNOSIS — M22.42 CHONDROMALACIA OF LEFT PATELLA: ICD-10-CM

## 2023-04-05 LAB
QT INTERVAL: 350 MS
QTC INTERVAL: 406 MS

## 2023-04-05 PROCEDURE — 93005 ELECTROCARDIOGRAM TRACING: CPT

## 2023-04-05 PROCEDURE — 93010 ELECTROCARDIOGRAM REPORT: CPT | Performed by: SPECIALIST

## 2023-04-05 RX ORDER — BIOTIN 10 MG
65 TABLET ORAL EVERY OTHER DAY
COMMUNITY

## 2023-04-06 ENCOUNTER — ANESTHESIA EVENT (OUTPATIENT)
Dept: PERIOP | Facility: HOSPITAL | Age: 43
End: 2023-04-06
Payer: COMMERCIAL

## 2023-04-06 ENCOUNTER — HOSPITAL ENCOUNTER (OUTPATIENT)
Facility: HOSPITAL | Age: 43
Setting detail: HOSPITAL OUTPATIENT SURGERY
Discharge: HOME | End: 2023-04-06
Attending: ORTHOPAEDIC SURGERY | Admitting: ORTHOPAEDIC SURGERY
Payer: COMMERCIAL

## 2023-04-06 ENCOUNTER — ANESTHESIA (OUTPATIENT)
Dept: PERIOP | Facility: HOSPITAL | Age: 43
End: 2023-04-06
Payer: COMMERCIAL

## 2023-04-06 ENCOUNTER — APPOINTMENT (OUTPATIENT)
Dept: GENERAL RADIOLOGY | Facility: HOSPITAL | Age: 43
End: 2023-04-06
Payer: COMMERCIAL

## 2023-04-06 VITALS
DIASTOLIC BLOOD PRESSURE: 88 MMHG | OXYGEN SATURATION: 97 % | HEIGHT: 66 IN | HEART RATE: 87 BPM | BODY MASS INDEX: 30.15 KG/M2 | SYSTOLIC BLOOD PRESSURE: 135 MMHG | WEIGHT: 187.6 LBS | RESPIRATION RATE: 18 BRPM | TEMPERATURE: 97.8 F

## 2023-04-06 DIAGNOSIS — M22.42 CHONDROMALACIA OF LEFT PATELLA: ICD-10-CM

## 2023-04-06 DIAGNOSIS — S83.241D ACUTE MEDIAL MENISCUS TEAR OF RIGHT KNEE, SUBSEQUENT ENCOUNTER: ICD-10-CM

## 2023-04-06 LAB — GLUCOSE BLDC GLUCOMTR-MCNC: 170 MG/DL (ref 70–130)

## 2023-04-06 PROCEDURE — 82962 GLUCOSE BLOOD TEST: CPT

## 2023-04-06 PROCEDURE — 25010000002 ONDANSETRON PER 1 MG: Performed by: NURSE ANESTHETIST, CERTIFIED REGISTERED

## 2023-04-06 PROCEDURE — 29877 ARTHRS KNEE SURG DBRDMT/SHVG: CPT | Performed by: ORTHOPAEDIC SURGERY

## 2023-04-06 PROCEDURE — 97161 PT EVAL LOW COMPLEX 20 MIN: CPT

## 2023-04-06 PROCEDURE — 25010000002 PROPOFOL 200 MG/20ML EMULSION: Performed by: NURSE ANESTHETIST, CERTIFIED REGISTERED

## 2023-04-06 PROCEDURE — 25010000002 MIDAZOLAM PER 1 MG: Performed by: NURSE ANESTHETIST, CERTIFIED REGISTERED

## 2023-04-06 PROCEDURE — 25010000002 FENTANYL CITRATE (PF) 50 MCG/ML SOLUTION: Performed by: NURSE ANESTHETIST, CERTIFIED REGISTERED

## 2023-04-06 PROCEDURE — 25010000002 KETOROLAC TROMETHAMINE PER 15 MG: Performed by: NURSE ANESTHETIST, CERTIFIED REGISTERED

## 2023-04-06 RX ORDER — SODIUM CHLORIDE, SODIUM LACTATE, POTASSIUM CHLORIDE, CALCIUM CHLORIDE 600; 310; 30; 20 MG/100ML; MG/100ML; MG/100ML; MG/100ML
125 INJECTION, SOLUTION INTRAVENOUS ONCE
Status: COMPLETED | OUTPATIENT
Start: 2023-04-06 | End: 2023-04-06

## 2023-04-06 RX ORDER — FAMOTIDINE 10 MG/ML
INJECTION, SOLUTION INTRAVENOUS AS NEEDED
Status: DISCONTINUED | OUTPATIENT
Start: 2023-04-06 | End: 2023-04-06 | Stop reason: SURG

## 2023-04-06 RX ORDER — MAGNESIUM HYDROXIDE 1200 MG/15ML
LIQUID ORAL AS NEEDED
Status: DISCONTINUED | OUTPATIENT
Start: 2023-04-06 | End: 2023-04-06 | Stop reason: HOSPADM

## 2023-04-06 RX ORDER — SODIUM CHLORIDE, SODIUM LACTATE, POTASSIUM CHLORIDE, CALCIUM CHLORIDE 600; 310; 30; 20 MG/100ML; MG/100ML; MG/100ML; MG/100ML
100 INJECTION, SOLUTION INTRAVENOUS ONCE AS NEEDED
Status: DISCONTINUED | OUTPATIENT
Start: 2023-04-06 | End: 2023-04-06 | Stop reason: HOSPADM

## 2023-04-06 RX ORDER — KETOROLAC TROMETHAMINE 30 MG/ML
30 INJECTION, SOLUTION INTRAMUSCULAR; INTRAVENOUS EVERY 6 HOURS PRN
Status: COMPLETED | OUTPATIENT
Start: 2023-04-06 | End: 2023-04-06

## 2023-04-06 RX ORDER — SODIUM CHLORIDE 0.9 % (FLUSH) 0.9 %
10 SYRINGE (ML) INJECTION AS NEEDED
Status: DISCONTINUED | OUTPATIENT
Start: 2023-04-06 | End: 2023-04-06 | Stop reason: HOSPADM

## 2023-04-06 RX ORDER — SODIUM CHLORIDE 0.9 % (FLUSH) 0.9 %
10 SYRINGE (ML) INJECTION EVERY 12 HOURS SCHEDULED
Status: DISCONTINUED | OUTPATIENT
Start: 2023-04-06 | End: 2023-04-06 | Stop reason: HOSPADM

## 2023-04-06 RX ORDER — FENTANYL CITRATE 50 UG/ML
INJECTION, SOLUTION INTRAMUSCULAR; INTRAVENOUS AS NEEDED
Status: DISCONTINUED | OUTPATIENT
Start: 2023-04-06 | End: 2023-04-06 | Stop reason: SURG

## 2023-04-06 RX ORDER — PROPOFOL 10 MG/ML
INJECTION, EMULSION INTRAVENOUS AS NEEDED
Status: DISCONTINUED | OUTPATIENT
Start: 2023-04-06 | End: 2023-04-06 | Stop reason: SURG

## 2023-04-06 RX ORDER — FENTANYL CITRATE 50 UG/ML
50 INJECTION, SOLUTION INTRAMUSCULAR; INTRAVENOUS
Status: DISCONTINUED | OUTPATIENT
Start: 2023-04-06 | End: 2023-04-06 | Stop reason: HOSPADM

## 2023-04-06 RX ORDER — MEPERIDINE HYDROCHLORIDE 25 MG/ML
12.5 INJECTION INTRAMUSCULAR; INTRAVENOUS; SUBCUTANEOUS
Status: DISCONTINUED | OUTPATIENT
Start: 2023-04-06 | End: 2023-04-06 | Stop reason: HOSPADM

## 2023-04-06 RX ORDER — IPRATROPIUM BROMIDE AND ALBUTEROL SULFATE 2.5; .5 MG/3ML; MG/3ML
3 SOLUTION RESPIRATORY (INHALATION) ONCE AS NEEDED
Status: DISCONTINUED | OUTPATIENT
Start: 2023-04-06 | End: 2023-04-06 | Stop reason: HOSPADM

## 2023-04-06 RX ORDER — OXYCODONE HYDROCHLORIDE AND ACETAMINOPHEN 5; 325 MG/1; MG/1
1 TABLET ORAL ONCE AS NEEDED
Status: DISCONTINUED | OUTPATIENT
Start: 2023-04-06 | End: 2023-04-06 | Stop reason: HOSPADM

## 2023-04-06 RX ORDER — ONDANSETRON 2 MG/ML
INJECTION INTRAMUSCULAR; INTRAVENOUS AS NEEDED
Status: DISCONTINUED | OUTPATIENT
Start: 2023-04-06 | End: 2023-04-06 | Stop reason: SURG

## 2023-04-06 RX ORDER — LIDOCAINE HYDROCHLORIDE 20 MG/ML
INJECTION, SOLUTION EPIDURAL; INFILTRATION; INTRACAUDAL; PERINEURAL AS NEEDED
Status: DISCONTINUED | OUTPATIENT
Start: 2023-04-06 | End: 2023-04-06 | Stop reason: SURG

## 2023-04-06 RX ORDER — MIDAZOLAM HYDROCHLORIDE 1 MG/ML
INJECTION INTRAMUSCULAR; INTRAVENOUS AS NEEDED
Status: DISCONTINUED | OUTPATIENT
Start: 2023-04-06 | End: 2023-04-06 | Stop reason: SURG

## 2023-04-06 RX ORDER — MIDAZOLAM HYDROCHLORIDE 1 MG/ML
1 INJECTION INTRAMUSCULAR; INTRAVENOUS
Status: DISCONTINUED | OUTPATIENT
Start: 2023-04-06 | End: 2023-04-06 | Stop reason: HOSPADM

## 2023-04-06 RX ORDER — HYDROCODONE BITARTRATE AND ACETAMINOPHEN 5; 325 MG/1; MG/1
1 TABLET ORAL EVERY 4 HOURS PRN
Qty: 8 TABLET | Refills: 0 | Status: SHIPPED | OUTPATIENT
Start: 2023-04-06

## 2023-04-06 RX ORDER — BUPIVACAINE HYDROCHLORIDE 5 MG/ML
INJECTION, SOLUTION PERINEURAL AS NEEDED
Status: DISCONTINUED | OUTPATIENT
Start: 2023-04-06 | End: 2023-04-06 | Stop reason: HOSPADM

## 2023-04-06 RX ORDER — ONDANSETRON 2 MG/ML
4 INJECTION INTRAMUSCULAR; INTRAVENOUS AS NEEDED
Status: DISCONTINUED | OUTPATIENT
Start: 2023-04-06 | End: 2023-04-06 | Stop reason: HOSPADM

## 2023-04-06 RX ORDER — SODIUM CHLORIDE 9 MG/ML
40 INJECTION, SOLUTION INTRAVENOUS AS NEEDED
Status: DISCONTINUED | OUTPATIENT
Start: 2023-04-06 | End: 2023-04-06 | Stop reason: HOSPADM

## 2023-04-06 RX ORDER — SODIUM CHLORIDE, SODIUM LACTATE, POTASSIUM CHLORIDE, CALCIUM CHLORIDE 600; 310; 30; 20 MG/100ML; MG/100ML; MG/100ML; MG/100ML
INJECTION, SOLUTION INTRAVENOUS CONTINUOUS PRN
Status: DISCONTINUED | OUTPATIENT
Start: 2023-04-06 | End: 2023-04-06 | Stop reason: SURG

## 2023-04-06 RX ADMIN — ONDANSETRON 4 MG: 2 INJECTION INTRAMUSCULAR; INTRAVENOUS at 09:16

## 2023-04-06 RX ADMIN — ONDANSETRON 4 MG: 2 INJECTION INTRAMUSCULAR; INTRAVENOUS at 07:45

## 2023-04-06 RX ADMIN — SODIUM CHLORIDE, POTASSIUM CHLORIDE, SODIUM LACTATE AND CALCIUM CHLORIDE 125 ML/HR: 600; 310; 30; 20 INJECTION, SOLUTION INTRAVENOUS at 06:47

## 2023-04-06 RX ADMIN — KETOROLAC TROMETHAMINE 30 MG: 30 INJECTION, SOLUTION INTRAMUSCULAR at 09:16

## 2023-04-06 RX ADMIN — PROPOFOL 200 MG: 10 INJECTION, EMULSION INTRAVENOUS at 07:38

## 2023-04-06 RX ADMIN — FENTANYL CITRATE 50 MCG: 50 INJECTION, SOLUTION INTRAMUSCULAR; INTRAVENOUS at 09:16

## 2023-04-06 RX ADMIN — FENTANYL CITRATE 100 MCG: 50 INJECTION INTRAMUSCULAR; INTRAVENOUS at 07:38

## 2023-04-06 RX ADMIN — FENTANYL CITRATE 50 MCG: 50 INJECTION, SOLUTION INTRAMUSCULAR; INTRAVENOUS at 09:04

## 2023-04-06 RX ADMIN — PROPOFOL 50 MG: 10 INJECTION, EMULSION INTRAVENOUS at 07:55

## 2023-04-06 RX ADMIN — LIDOCAINE HYDROCHLORIDE 60 MG: 20 INJECTION, SOLUTION EPIDURAL; INFILTRATION; INTRACAUDAL; PERINEURAL at 07:38

## 2023-04-06 RX ADMIN — SODIUM CHLORIDE, POTASSIUM CHLORIDE, SODIUM LACTATE AND CALCIUM CHLORIDE: 600; 310; 30; 20 INJECTION, SOLUTION INTRAVENOUS at 07:34

## 2023-04-06 RX ADMIN — PROPOFOL 50 MG: 10 INJECTION, EMULSION INTRAVENOUS at 07:40

## 2023-04-06 RX ADMIN — MIDAZOLAM HYDROCHLORIDE 2 MG: 1 INJECTION, SOLUTION INTRAMUSCULAR; INTRAVENOUS at 07:34

## 2023-04-06 RX ADMIN — FAMOTIDINE 20 MG: 10 INJECTION, SOLUTION INTRAVENOUS at 07:34

## 2023-04-06 NOTE — THERAPY DISCHARGE NOTE
Acute Care - Physical Therapy Initial Evaluation/Discharge   [facility]     Patient Name: Camilo Harper  : 1980  MRN: [id number]  Today's Date: 2023   Onset of Illness/Injury or Date of Surgery: 23     Referring Physician: Gloria      Admit Date: 2023    Visit Dx:    ICD-10-CM ICD-9-CM   1. Chondromalacia of left patella  M22.42 717.7   2. Acute medial meniscus tear of right knee, subsequent encounter  S83.241D V58.89     836.0     Patient Active Problem List   Diagnosis   • Cough   • Dizziness   • Abnormal EKG   • Essential hypertension   • Type 2 diabetes mellitus without complication, without long-term current use of insulin   • Family history of premature coronary artery disease   • Precordial pain   • Primary osteoarthritis of left knee   • Acute medial meniscus tear of right knee     Past Medical History:   Diagnosis Date   • Depression    • Diabetes mellitus    • GERD (gastroesophageal reflux disease)    • Hypertension    • Kidney stones    • Thyroid disease      Past Surgical History:   Procedure Laterality Date   • COLONOSCOPY     • CYSTOSCOPY W/ LASER LITHOTRIPSY     • ENDOSCOPY     • TONSILLECTOMY     • VASECTOMY         PT Assessment (last 12 hours)     PT Evaluation and Treatment     Row Name 23 0930          Physical Therapy Time and Intention    Subjective Information no complaints  -CS     Document Type evaluation  -CS     Mode of Treatment physical therapy  -CS     Patient Effort good  -CS     Symptoms Noted During/After Treatment none  -CS     Comment Pt seen bedside in recovery.  Pt was (I) w/ mobility prior to surgery.  Pt agreed to education/crutch training.  -CS     Row Name 23 0930          General Information    Patient Profile Reviewed yes  -CS     Onset of Illness/Injury or Date of Surgery 23  -CS     Referring Physician Gloria  -CS     Patient Observations alert;cooperative;agree to therapy  -CS     Risks Reviewed patient:;LOB;dizziness;change in  vital signs  -CS     Benefits Reviewed patient:;improve function;increase independence;increase strength;increase balance;decrease pain;increase knowledge  -CS     Row Name 04/06/23 0930          Previous Level of Function/Home Environm    Stairs, Premorbid Functional Level independent  -     Community Ambulation, Premorbid Functional Level independent  -     Row Name 04/06/23 0930          Pain    Additional Documentation --  no c/o  -CS     Row Name 04/06/23 0930          Cognition    Orientation Status (Cognition) oriented x 4  -CS     Row Name 04/06/23 0930          Range of Motion (ROM)    Range of Motion --  (L)knee limited by post-op pain/bandage  -CS     Row Name 04/06/23 0930          Strength Comprehensive (MMT)    Comment, General Manual Muscle Testing (MMT) Assessment (R)LE WFL, (L)LE 3-/5  -     Row Name 04/06/23 0930          Bed Mobility    Bed Mobility bed mobility (all) activities  -     All Activities, [place] (Bed Mobility) independent;standby assist  -     Row Name 04/06/23 0930          Transfers    Comment, (Transfers) SBA/mod(I) w/ crutches  -     Row Name 04/06/23 0930          Gait/Stairs (Locomotion)    Gait/Stairs Locomotion gait/ambulation independence;gait/ambulation assistive device  -     [place] Level (Gait) modified independence;standby assist  -CS     Assistive Device (Gait) crutches, axillary  -CS     Ambulated day of surgery or within 4 hours of PACU discharge yes  -CS     Distance in Feet (Gait) 25'  -CS     Pattern (Gait) swing-to;swing-through  -CS     Comment, (Gait/Stairs) Pt w/ safe use of crutches after cues for swinging thru  -CS     Row Name 04/06/23 0930          Balance    Balance Assessment --  FAIR+/GOOD- - able to maintain TTWB w/ axillary crutches  -     Row Name             Wound 04/06/23 0808 Left anterior knee Incision    Wound - Properties Group Placement Date: 04/06/23  -LC Placement Time: 0808  -LC Present on Hospital Admission: N   -LC Side: Left  -LC Orientation: anterior  -LC Location: knee  -LC Primary Wound Type: Incision  -LC, 3 trocar sites     Retired Wound - Properties Group Placement Date: 04/06/23  -LC Placement Time: 0808 -LC Present on Hospital Admission: N  -LC Side: Left  -LC Orientation: anterior  -LC Location: knee  -LC Primary Wound Type: Incision  -LC, 3 trocar sites     Retired Wound - Properties Group Date first assessed: 04/06/23  -LC Time first assessed: 0808 -LC Present on Hospital Admission: N  -LC Side: Left  -LC Location: knee  -LC Primary Wound Type: Incision  -LC, 3 trocar sites     Row Name 04/06/23 0930          Plan of Care Review    Plan of Care Reviewed With patient  -CS     Progress improving  -CS     Outcome Evaluation Pt demonstrates safe use of axillary crutches and knowledge and ability to maintain TTWB(L)LE.  Pt safe for d/c from PT standpoint.  -CS     Row Name 04/06/23 0930          Positioning and Restraints    Pre-Treatment Position in bed  -CS     Post Treatment Position bed  -CS     In Bed sitting EOB  -CS     Row Name 04/06/23 0930          Therapy Assessment/Plan (PT)    Functional Level at Time of Evaluation (PT) mod(I)/SBA  -CS     PT Diagnosis (PT) difficulty walking  -CS     Rehab Potential (PT) good, to achieve stated therapy goals  -CS     Criteria for Skilled Interventions Met (PT) yes  -CS     Therapy Frequency (PT) evaluation only  crutch training.  -CS     Comment, Therapy Assessment/Plan (PT) Pt demonstrates safe use of axillary crutches and knowledge and ability to maintain TTWB(L)LE.  Pt safe for d/c from PT standpoint.  -CS     Row Name 04/06/23 0930          PT Evaluation Complexity    History, PT Evaluation Complexity 1-2 personal factors and/or comorbidities  -CS     Examination of Body Systems (PT Eval Complexity) 1-2 elements  -CS     Clinical Presentation (PT Evaluation Complexity) stable  -CS     Clinical Decision Making (PT Evaluation Complexity) low complexity  -CS      Overall Complexity (PT Evaluation Complexity) low complexity  -CS     Row Name 04/06/23 0930          Therapy Plan Review/Discharge Plan (PT)    Therapy Plan Review (PT) evaluation/treatment results reviewed;care plan/treatment goals reviewed;risks/benefits reviewed;current/potential barriers reviewed;participants voiced agreement with care plan;participants included;patient  -CS     Row Name 04/06/23 0930          Physical Therapy Goals    Gait Training Goal Selection (PT) gait training, PT goal 1  -CS     Row Name 04/06/23 0930          Gait Training Goal 1 (PT)    Activity/Assistive Device (Gait Training Goal 1, PT) gait (walking locomotion);assistive device use  -CS     [place] Level (Gait Training Goal 1, PT) modified independence;standby assist  -CS     Distance (Gait Training Goal 1, PT) 25'  -CS     Time Frame (Gait Training Goal 1, PT) long term goal (LTG);by discharge  -CS     Progress/Outcome (Gait Training Goal 1, PT) goal met  -CS     Row Name 04/06/23 0930          Discharge Summary (PT)    Additional Documentation --  D/C acute PT.  -CS           User Key  (r) = Recorded By, (t) = Taken By, (c) = Cosigned By    Initials Name Provider Type    CS Harry Shepherd, PT Physical Therapist    Lazara Casiano, RN Registered Nurse                  Physical Therapy Education     Title: PT OT SLP Therapies (Done)     Topic: Physical Therapy (Done)     Point: Mobility training (Done)     Learning Progress Summary           Patient Acceptance, E,TB, VU by CS at 4/6/2023 1013                   Point: Home exercise program (Done)     Learning Progress Summary           Patient Acceptance, E,TB, VU by CS at 4/6/2023 1013                   Point: Body mechanics (Done)     Learning Progress Summary           Patient Acceptance, E,TB, VU by CS at 4/6/2023 1013                   Point: Precautions (Done)     Learning Progress Summary           Patient Acceptance, E,TB, VU by CS at 4/6/2023 1013                                User Key     Initials Effective Dates Name Provider Type Discipline     05/24/22 -  Harry Shepherd, PT Physical Therapist PT                PT Recommendation and Plan  Therapy Frequency (PT): evaluation only (crutch training.)  Plan of Care Reviewed With: patient  Progress: improving  Outcome Evaluation: Pt demonstrates safe use of axillary crutches and knowledge and ability to maintain TTWB(L)LE.  Pt safe for d/c from PT standpoint.         Time Calculation:    PT Charges     Row Name 04/06/23 1014             Time Calculation    Start Time 0930  -      PT Received On 04/06/23  -            User Key  (r) = Recorded By, (t) = Taken By, (c) = Cosigned By    Initials Name Provider Type    CS Harry Shepherd, PT Physical Therapist              Therapy Charges for Today     Code Description Service Date Service Provider Modifiers Qty    04237604407 HC PT EVAL LOW COMPLEXITY 4 4/6/2023 Harry Shepherd, PT GP 1                    Harry Shepherd PT  4/6/2023

## 2023-04-06 NOTE — ANESTHESIA POSTPROCEDURE EVALUATION
Patient: Camilo Harper    Procedure Summary     Date: 04/06/23 Room / Location: [facility] OR 03 /  [facility] OR    Anesthesia Start: 0734 Anesthesia Stop: 0857    Procedure: LEFT KNEE ARTHROSCOPY WITH EXCISION OF PLAQUE, CHONDROPLASTY of PATELLA (Left: Knee) Diagnosis:       Acute medial meniscus tear of right knee, subsequent encounter      Chondromalacia of left patella      (Acute medial meniscus tear of right knee, subsequent encounter [S83.241D])      (Chondromalacia of left patella [M22.42])    Surgeons: Cedric Castro MD Provider: Vikas Graham MD    Anesthesia Type: general ASA Status: 3          Anesthesia Type: general    Vitals  No vitals data found for the desired time range.          Post Anesthesia Care and Evaluation    Patient location during evaluation: PACU  Patient participation: complete - patient participated  Level of consciousness: awake  Pain score: 1  Pain management: adequate    Airway patency: patent  Anesthetic complications: No anesthetic complications  PONV Status: none  Cardiovascular status: acceptable  Respiratory status: acceptable  Hydration status: acceptable

## 2023-04-06 NOTE — ANESTHESIA PREPROCEDURE EVALUATION
Anesthesia Evaluation     Patient summary reviewed and Nursing notes reviewed   no history of anesthetic complications:  NPO Solid Status: > 8 hours  NPO Liquid Status: > 8 hours           Airway   Mallampati: II  TM distance: >3 FB  Neck ROM: full  Dental - normal exam     Pulmonary     breath sounds clear to auscultation  (+) a smoker Current Smoked day of surgery,   Cardiovascular   Exercise tolerance: good (4-7 METS)    ECG reviewed  Rhythm: regular  Rate: normal    (+) hypertension,       Neuro/Psych  (+) dizziness/light headedness, psychiatric history Anxiety,    GI/Hepatic/Renal/Endo    (+) obesity,   renal disease stones, diabetes mellitus, thyroid problem hypothyroidism    Musculoskeletal     Abdominal   (+) obese,     Abdomen: soft.   Substance History - negative use     OB/GYN          Other   arthritis,      ROS/Med Hx Other: Normal stress test 12/2020 normal EF 61%  Abnormal EKG show poss inferior infarct and anterolateral infarct before stress test. Hemoglobin A1C 7.6                Anesthesia Plan    ASA 3     general     intravenous induction     Anesthetic plan, risks, benefits, and alternatives have been provided, discussed and informed consent has been obtained with: patient.    Use of blood products discussed with consented to blood products.       CODE STATUS:

## 2023-04-06 NOTE — OP NOTE
KNEE ARTHROSCOPY WITH CHONDROPLASTY  Procedure Note    Camilo Harper  4/6/2023    Pre-op Diagnosis:   Under malacia patella possible medial meniscus tear left knee  Post-op Diagnosis:     Post-Op Diagnosis Codes:     *     * Chondromalacia of left patella [M22.42]  Plica left knee       Procedure(s):  LEFT KNEE ARTHROSCOPY WITH EXCISION OF PLAQUE, CHONDROPLASTY of PATELLA    Surgeon(s):  Cedric Castro MD    Anesthesia: Genera/local    Operative technique: With patient in the operating theatre under general anesthesia with left lower extremity placed in leg thakur and tourniquet the left extremity sterilely prepped draped usual manner.  Standard arthroscopy portals were created the knee inflated with sterile saline.  Through the anterolateral portal the arthroscope passed into the suprapatellar pouch and brought distally.  Proximal and mid portions patella unremarkable.  Distally there was prominent area probe introduced the area was probed the articular cartilage was partially detached.  Plica was also identified  Medial compartment was entered the medial meniscus extensively probed without appreciable defect.  Particular attention to the posterior medial corner probing superiorly there was no defect.  Intercondylar notch revealed intact ACL.  Lateral compartment pristine lateral meniscus stable by probing.  Patellofemoral joint was reentered and with 3.5 nonaggressive shaver the distal medial aspect of the patella was debrided removing loose articular cartilage and prominent bone in the area.  The defect was repeatedly probed and treated with shaver until loose articular cartilage and bone were removed.  The plica was also excised with 3.5 nonaggressive shaver.  The defect within the distal medial pole of patella was then gently debrided with a rasp.  Additional irrigation was carried out.  The instruments were removed water was expressed portals injected with 10 cc 0.5 Marcaine wounds closed with 3-0  nylon was sterile dressing applied he was taken to recovery room in stable condition.  Debris was evacuated from  Staff:   Circulator: Lazraa Way RN  Scrub Person: Radha Leon  Assistant: Selin Husain    Estimated Blood Loss: none    Specimens:   none               Implants/Grafts: none      Drains: None    Complications: none    Tourniquet time: 55   min         Cedric Castro MD     Date: 4/6/2023  Time: 08:56 EDT    Cc: Stephanie Green APRN

## 2023-04-06 NOTE — ANESTHESIA PROCEDURE NOTES
Airway  Urgency: elective    Date/Time: 4/6/2023 7:40 AM    General Information and Staff    Patient location during procedure: OR    Indications and Patient Condition    Preoxygenated: yes  Mask difficulty assessment: 0 - not attempted    Final Airway Details  Final airway type: supraglottic airway      Successful airway: LMA  Size 4     Number of attempts at approach: 1  Assessment: lips, teeth, and gum same as pre-op

## 2023-04-12 ENCOUNTER — OFFICE VISIT (OUTPATIENT)
Dept: ORTHOPEDIC SURGERY | Facility: CLINIC | Age: 43
End: 2023-04-12
Payer: COMMERCIAL

## 2023-04-12 VITALS — WEIGHT: 187.61 LBS | BODY MASS INDEX: 30.15 KG/M2 | HEIGHT: 66 IN

## 2023-04-12 DIAGNOSIS — M62.81 QUADRICEPS WEAKNESS: ICD-10-CM

## 2023-04-12 DIAGNOSIS — Z09 POSTOP CHECK: Primary | ICD-10-CM

## 2023-04-12 PROCEDURE — 99024 POSTOP FOLLOW-UP VISIT: CPT | Performed by: ORTHOPAEDIC SURGERY

## 2023-04-12 NOTE — LETTER
April 12, 2023     Patient: Camilo Harper   YOB: 1980   Date of Visit: 4/12/2023       To Whom It May Concern:    It is my medical opinion that Camilo Harper may return to work on Monday, April 17, 2023. He is restricted from climbing for 6 weeks. He will be reevaluated in 6 weeks.            Sincerely,        Cedric Castro MD

## 2023-04-12 NOTE — PROGRESS NOTES
Patient: Camilo Harper  YOB: 1980  Date of Encounter: 04/12/2023      Chief Complaint:   Chief Complaint   Patient presents with   • Left Knee - Post-op Knee        Procedure(s):04/06/2023  LEFT KNEE ARTHROSCOPY WITH EXCISION OF PLAQUE, CHONDROPLASTY of PATELLA     Surgeon(s):  Cedric Castro MD            HPI:  Camilo Harper, 42 y.o. male returns in postoperative follow-up left knee arthroscopy chondroplasty distal pole telemetry April 6, 2023.  He is 6 days postop and doing well he presents today ambulating with limited weightbearing to his left leg.      Medical History:  Patient Active Problem List   Diagnosis   • Cough   • Dizziness   • Abnormal EKG   • Essential hypertension   • Type 2 diabetes mellitus without complication, without long-term current use of insulin   • Family history of premature coronary artery disease   • Precordial pain   • Primary osteoarthritis of left knee   • Acute medial meniscus tear of right knee     Past Medical History:   Diagnosis Date   • Depression    • Diabetes mellitus    • GERD (gastroesophageal reflux disease)    • Hypertension    • Kidney stones    • Thyroid disease          Surgical History:  Past Surgical History:   Procedure Laterality Date   • COLONOSCOPY     • CYSTOSCOPY W/ LASER LITHOTRIPSY     • ENDOSCOPY     • KNEE ARTHROSCOPY Left 4/6/2023    Procedure: LEFT KNEE ARTHROSCOPY WITH EXCISION OF PLAQUE, CHONDROPLASTY of PATELLA;  Surgeon: Cedric Castro MD;  Location: [facility];  Service: Orthopedics;  Laterality: Left;   • TONSILLECTOMY     • VASECTOMY             Examination:  Examination left knee reveals minimal effusion with intact arthroscopy portals.        Assessment & Plan:  42 y.o. male presents follow-up chondroplasty distal pole left patella 6 days ago.  He is referred to physical therapy will work on strengthening progressed to full weightbearing and follow-up in 6 weeks.       Diagnosis Plan   1. Postop check   Ambulatory Referral to Physical Therapy Evaluate and treat, POST OP      2. Quadriceps weakness  Ambulatory Referral to Physical Therapy Evaluate and treat, POST OP                Cc:  Stephanie Green, APRN              This document has been electronically signed by Cedric Castro MD   April 12, 2023 10:43 EDT

## 2023-04-14 NOTE — H&P
History and Physical        Patient: Camilo Harper  YOB: 1980  Date of Encounter: 04/06/2023        Chief Complaint:       Chief Complaint   Patient presents with   • Left Knee - Pain, Follow-up               HPI:   Camilo Harper, 42 y.o. male, presents in follow-up with continued left knee pain.  He presents today wishing to discuss surgical option for his left knee continues to struggle with pain especially ascending and descending steps feels a popping sensation which usually relieves his pain he describes it as a grinding sensation going up and down steps.  His symptoms began October 2022.  Fell landing directly on his left knee.  Symptoms of left knee pain prior to this event he continues to struggle with left knee pain since which has not improved with intra-articular steroid injections.  He has had MRI obtained in the past which identified medial meniscus tear.           Active Problem List:      Patient Active Problem List   Diagnosis   • Cough   • Dizziness   • Abnormal EKG   • Essential hypertension   • Type 2 diabetes mellitus without complication, without long-term current use of insulin (HCC)   • Family history of premature coronary artery disease   • Precordial pain   • Primary osteoarthritis of left knee               Past Medical History:  Medical History        Past Medical History:   Diagnosis Date   • Diabetes mellitus (HCC)     • Hypertension     • Thyroid disease                    Past Surgical History:  Surgical History         Past Surgical History:   Procedure Laterality Date   • VASECTOMY                      Family History:        Family History   Problem Relation Age of Onset   • Hypertension Father     • Heart disease Father     • Hypertension Mother     • Heart disease Mother     • Urolithiasis Maternal Uncle                 Social History:  Social History   Social History            Socioeconomic History   • Marital status: Single   Tobacco Use   • Smoking status:  Former       Packs/day: 2.00       Types: Cigarettes       Quit date: 2017       Years since quittin.2   • Smokeless tobacco: Never   Vaping Use   • Vaping Use: Never used   Substance and Sexual Activity   • Alcohol use: No   • Drug use: No   • Sexual activity: Defer         Body mass index is 30.83 kg/m².           Medications:  Current Medications          Current Outpatient Medications   Medication Sig Dispense Refill   • aspirin 81 MG EC tablet Take 1 tablet by mouth Daily.       • carvedilol (COREG) 6.25 MG tablet 1 tablet 2 (Two) Times a Day With Meals.   0   • Empagliflozin (JARDIANCE PO) Take  by mouth Daily.       • levothyroxine (SYNTHROID, LEVOTHROID) 175 MCG tablet 150 mcg Daily.   0   • metFORMIN (GLUCOPHAGE) 500 MG tablet 2 tablets 2 (Two) Times a Day With Meals.   0   • pantoprazole (PROTONIX) 40 MG EC tablet 1 tablet Daily.   0   • sertraline (ZOLOFT) 50 MG tablet Take 1 tablet by mouth Daily.       • Diclofenac Sodium (VOLTAREN) 1 % gel gel         • methylPREDNISolone (MEDROL) 4 MG dose pack Use as directed by package instructions 21 tablet 0   • vitamin B-12 (CYANOCOBALAMIN) 100 MCG tablet Take 50 mcg by mouth Daily.          No current facility-administered medications for this visit.                  Allergies:  No Known Allergies           Review of Systems:   Review of Systems   Constitutional: Negative.    HENT: Negative.    Eyes: Negative.    Respiratory: Negative.    Cardiovascular: Negative.    Gastrointestinal: Negative.    Endocrine: Negative.    Genitourinary: Negative.    Musculoskeletal: Positive for arthralgias.   Skin: Negative.    Allergic/Immunologic: Negative.    Hematological: Negative.    Psychiatric/Behavioral: Negative.               Physical Exam:   Physical Exam  Vitals and nursing note reviewed.   Constitutional:       General: He is not in acute distress.     Appearance: He is not diaphoretic.   HENT:      Head: Normocephalic and atraumatic.      Right Ear: External  "ear normal.      Left Ear: External ear normal.   Eyes:      General:         Right eye: No discharge.         Left eye: No discharge.      Conjunctiva/sclera: Conjunctivae normal.   Cardiovascular:      Rate and Rhythm: Normal rate and regular rhythm.      Heart sounds: Normal heart sounds. No murmur heard.  Pulmonary:      Effort: Pulmonary effort is normal. No respiratory distress.      Breath sounds: Normal breath sounds. No wheezing.   Abdominal:      General: There is no distension.      Palpations: Abdomen is soft.      Tenderness: There is no guarding.   Musculoskeletal:      Cervical back: Normal range of motion and neck supple.   Skin:     General: Skin is warm and dry.      Capillary Refill: Capillary refill takes less than 2 seconds.   Neurological:      Mental Status: He is alert and oriented to person, place, and time.   Psychiatric:         Behavior: Behavior normal.         Thought Content: Thought content normal.         Judgment: Judgment normal.         GENERAL: 42 y.o. male, alert and oriented X 3 in no acute distress.   Visit Vitals  /83   Pulse 86   Ht 167.6 cm (65.98\")   Wt 86.6 kg (190 lb 14.7 oz)   BMI 30.83 kg/m²               Musculoskeletal Examination: Left knee reveals minimal medial joint line tenderness no lateral joint line tenderness.  He has no gross instability with varus valgus stressing Lachman or drawer he demonstrates full flexion and extension.  At approximately 30 degrees of flexion he has crepitance and associated pain with compression of the patella.  He demonstrates normal patellar tracking with normal neurovascular status.        Radiology/Labs:    XR Hip With or Without Pelvis 2 - 3 View Right     Result Date: 3/15/2023    No acute findings in the right hip.  This report was finalized on 3/15/2023 4:13 PM by Dr. Heath Paul MD.             Radiographs left knee show tach medial lateral joint space.   small osteophyte off the superior and inferior poles of the " patella.           Assessment & Plan:   42 y.o. male presents in follow-up left knee injury sustained when he fell and landed on a curb since then he struggled with knee pain has benefited from intra-articular steroid injection x4 weeks and also from viscous injection left knee x4 weeks.  Presentation is concerning for medial meniscus tear but also for malacia patella.  We discussed his options at length.  He wishes to proceed with left knee arthroscopy possible partial medial meniscectomy also possible chondroplasty with drilling of the patella depending on intraoperative findings.  Surgery is scheduled April 6, 2023.               ICD-10-CM ICD-9-CM   1. Chondromalacia of left patella  M22.42 717.7   2. Acute medial meniscus tear of right knee, subsequent encounter  S83.241D V58.89     836.0              Cc:   Stephanie Green, HANNA              This document has been electronically signed by Cedric Castro MD   April 14, 2023 09:08 EDT

## 2023-05-24 ENCOUNTER — OFFICE VISIT (OUTPATIENT)
Dept: ORTHOPEDIC SURGERY | Facility: CLINIC | Age: 43
End: 2023-05-24
Payer: COMMERCIAL

## 2023-05-24 VITALS — HEIGHT: 66 IN | WEIGHT: 187.61 LBS | BODY MASS INDEX: 30.15 KG/M2

## 2023-05-24 DIAGNOSIS — Z09 POSTOP CHECK: Primary | ICD-10-CM

## 2023-05-24 NOTE — PROGRESS NOTES
Postoperative Follow-up          Patient: Camilo Harper  YOB: 1980  Date of Encounter: 05/24/2023      Chief Complaint:   Chief Complaint   Patient presents with   • Left Knee - Post-op      04/06/23 (6w 6d) Cedric Castro MD   LEFT KNEE ARTHROSCOPY WITH EXCISION OF PLAQUE, CHONDROPLASTY of PATELLA - Left                HPI:  Camilo Harper, 42 y.o. male returns in postoperative follow-up left knee arthroscopy with chondroplasty distal pole of patella he 7 weeks postop has attended physical therapy done strengthening on his own and presents today pain-free.        Medical History:  Patient Active Problem List   Diagnosis   • Cough   • Dizziness   • Abnormal EKG   • Essential hypertension   • Type 2 diabetes mellitus without complication, without long-term current use of insulin   • Family history of premature coronary artery disease   • Precordial pain   • Primary osteoarthritis of left knee   • Acute medial meniscus tear of right knee     Past Medical History:   Diagnosis Date   • Depression    • Diabetes mellitus    • GERD (gastroesophageal reflux disease)    • Hypertension    • Kidney stones    • Thyroid disease            Surgical History:  Past Surgical History:   Procedure Laterality Date   • COLONOSCOPY     • CYSTOSCOPY W/ LASER LITHOTRIPSY     • ENDOSCOPY     • KNEE ARTHROSCOPY Left 4/6/2023    Procedure: LEFT KNEE ARTHROSCOPY WITH EXCISION OF PLAQUE, CHONDROPLASTY of PATELLA;  Surgeon: Cedric Castro MD;  Location: [facility];  Service: Orthopedics;  Laterality: Left;   • TONSILLECTOMY     • VASECTOMY           Examination:  Examination left knee shows no effusion intact arthroscopy portals.        Assessment & Plan:  42 y.o. male presents follow-up arthroscopic chondroplasty distal pole patella doing well.  He will return with any problems in the future.       Diagnosis Plan   1. Postop check                Cc:  Stephanie Green, HANNA              This document  has been electronically signed by Cedric Castro MD   May 26, 2023 09:20 EDT

## 2023-10-04 ENCOUNTER — TRANSCRIBE ORDERS (OUTPATIENT)
Dept: ADMINISTRATIVE | Facility: HOSPITAL | Age: 43
End: 2023-10-04
Payer: COMMERCIAL

## 2023-10-04 ENCOUNTER — LAB (OUTPATIENT)
Dept: LAB | Facility: HOSPITAL | Age: 43
End: 2023-10-04
Payer: COMMERCIAL

## 2023-10-04 DIAGNOSIS — D72.829 LEUKOCYTOSIS, UNSPECIFIED TYPE: ICD-10-CM

## 2023-10-04 DIAGNOSIS — E11.9 DIABETES MELLITUS WITHOUT COMPLICATION: ICD-10-CM

## 2023-10-04 DIAGNOSIS — E07.9 DISEASE OF THYROID GLAND: ICD-10-CM

## 2023-10-04 DIAGNOSIS — E78.5 HYPERLIPIDEMIA, UNSPECIFIED HYPERLIPIDEMIA TYPE: ICD-10-CM

## 2023-10-04 DIAGNOSIS — E11.9 DIABETES MELLITUS WITHOUT COMPLICATION: Primary | ICD-10-CM

## 2023-10-04 PROCEDURE — 83036 HEMOGLOBIN GLYCOSYLATED A1C: CPT

## 2023-10-04 PROCEDURE — 82570 ASSAY OF URINE CREATININE: CPT

## 2023-10-04 PROCEDURE — 85007 BL SMEAR W/DIFF WBC COUNT: CPT

## 2023-10-04 PROCEDURE — 80061 LIPID PANEL: CPT

## 2023-10-04 PROCEDURE — 84439 ASSAY OF FREE THYROXINE: CPT

## 2023-10-04 PROCEDURE — 36415 COLL VENOUS BLD VENIPUNCTURE: CPT

## 2023-10-04 PROCEDURE — 82043 UR ALBUMIN QUANTITATIVE: CPT

## 2023-10-04 PROCEDURE — 80050 GENERAL HEALTH PANEL: CPT

## 2023-10-05 LAB
ALBUMIN SERPL-MCNC: 4.4 G/DL (ref 3.5–5.2)
ALBUMIN UR-MCNC: <1.2 MG/DL
ALBUMIN/GLOB SERPL: 1.6 G/DL
ALP SERPL-CCNC: 89 U/L (ref 39–117)
ALT SERPL W P-5'-P-CCNC: 24 U/L (ref 1–41)
ANION GAP SERPL CALCULATED.3IONS-SCNC: 14.6 MMOL/L (ref 5–15)
ANISOCYTOSIS BLD QL: ABNORMAL
AST SERPL-CCNC: 28 U/L (ref 1–40)
BILIRUB SERPL-MCNC: 0.3 MG/DL (ref 0–1.2)
BUN SERPL-MCNC: 14 MG/DL (ref 6–20)
BUN/CREAT SERPL: 17.7 (ref 7–25)
CALCIUM SPEC-SCNC: 9.5 MG/DL (ref 8.6–10.5)
CHLORIDE SERPL-SCNC: 105 MMOL/L (ref 98–107)
CHOLEST SERPL-MCNC: 123 MG/DL (ref 0–200)
CO2 SERPL-SCNC: 17.4 MMOL/L (ref 22–29)
CREAT SERPL-MCNC: 0.79 MG/DL (ref 0.76–1.27)
CREAT UR-MCNC: 52.3 MG/DL
DEPRECATED RDW RBC AUTO: 42.5 FL (ref 37–54)
EGFRCR SERPLBLD CKD-EPI 2021: 113.7 ML/MIN/1.73
ERYTHROCYTE [DISTWIDTH] IN BLOOD BY AUTOMATED COUNT: 14.2 % (ref 12.3–15.4)
GLOBULIN UR ELPH-MCNC: 2.8 GM/DL
GLUCOSE SERPL-MCNC: 78 MG/DL (ref 65–99)
HBA1C MFR BLD: 7.2 % (ref 4.8–5.6)
HCT VFR BLD AUTO: 51.3 % (ref 37.5–51)
HDLC SERPL-MCNC: 35 MG/DL (ref 40–60)
HGB BLD-MCNC: 17.4 G/DL (ref 13–17.7)
LDLC SERPL CALC-MCNC: 57 MG/DL (ref 0–100)
LDLC/HDLC SERPL: 1.44 {RATIO}
LYMPHOCYTES # BLD MANUAL: 5.92 10*3/MM3 (ref 0.7–3.1)
LYMPHOCYTES NFR BLD MANUAL: 3 % (ref 5–12)
MCH RBC QN AUTO: 28.2 PG (ref 26.6–33)
MCHC RBC AUTO-ENTMCNC: 33.9 G/DL (ref 31.5–35.7)
MCV RBC AUTO: 83 FL (ref 79–97)
MICROALBUMIN/CREAT UR: NORMAL MG/G{CREAT}
MICROCYTES BLD QL: ABNORMAL
MONOCYTES # BLD: 0.5 10*3/MM3 (ref 0.1–0.9)
NEUTROPHILS # BLD AUTO: 10.3 10*3/MM3 (ref 1.7–7)
NEUTROPHILS NFR BLD MANUAL: 61.6 % (ref 42.7–76)
PLAT MORPH BLD: NORMAL
PLATELET # BLD AUTO: 240 10*3/MM3 (ref 140–450)
PMV BLD AUTO: 10.8 FL (ref 6–12)
POTASSIUM SERPL-SCNC: 4.4 MMOL/L (ref 3.5–5.2)
PROT SERPL-MCNC: 7.2 G/DL (ref 6–8.5)
RBC # BLD AUTO: 6.18 10*6/MM3 (ref 4.14–5.8)
SODIUM SERPL-SCNC: 137 MMOL/L (ref 136–145)
T4 FREE SERPL-MCNC: 1.96 NG/DL (ref 0.93–1.7)
TRIGL SERPL-MCNC: 188 MG/DL (ref 0–150)
TSH SERPL DL<=0.05 MIU/L-ACNC: 0.49 UIU/ML (ref 0.27–4.2)
VARIANT LYMPHS NFR BLD MANUAL: 35.4 % (ref 19.6–45.3)
VLDLC SERPL-MCNC: 31 MG/DL (ref 5–40)
WBC MORPH BLD: NORMAL
WBC NRBC COR # BLD: 16.72 10*3/MM3 (ref 3.4–10.8)

## 2023-12-07 ENCOUNTER — TRANSCRIBE ORDERS (OUTPATIENT)
Dept: ADMINISTRATIVE | Facility: HOSPITAL | Age: 43
End: 2023-12-07
Payer: COMMERCIAL

## 2023-12-07 DIAGNOSIS — R10.9 ABDOMINAL PAIN, UNSPECIFIED ABDOMINAL LOCATION: Primary | ICD-10-CM

## 2023-12-19 ENCOUNTER — HOSPITAL ENCOUNTER (OUTPATIENT)
Dept: CT IMAGING | Facility: HOSPITAL | Age: 43
Discharge: HOME | End: 2023-12-19
Payer: COMMERCIAL

## 2023-12-19 DIAGNOSIS — R10.9 ABDOMINAL PAIN, UNSPECIFIED ABDOMINAL LOCATION: ICD-10-CM

## 2023-12-19 PROCEDURE — 74176 CT ABD & PELVIS W/O CONTRAST: CPT

## 2024-03-15 ENCOUNTER — HOSPITAL ENCOUNTER (EMERGENCY)
Facility: HOSPITAL | Age: 44
Discharge: LEFT BEFORE DISCHARGE | End: 2024-03-16
Payer: COMMERCIAL

## 2024-03-15 VITALS
BODY MASS INDEX: 28.93 KG/M2 | TEMPERATURE: 98.2 F | RESPIRATION RATE: 20 BRPM | SYSTOLIC BLOOD PRESSURE: 129 MMHG | WEIGHT: 180 LBS | DIASTOLIC BLOOD PRESSURE: 91 MMHG | HEIGHT: 66 IN | OXYGEN SATURATION: 97 % | HEART RATE: 93 BPM

## 2024-03-15 LAB
AMPHET+METHAMPHET UR QL: NEGATIVE
AMPHETAMINES UR QL: NEGATIVE
BARBITURATES UR QL SCN: NEGATIVE
BASOPHILS # BLD AUTO: 0.08 10*3/MM3 (ref 0–0.2)
BASOPHILS NFR BLD AUTO: 0.4 % (ref 0–1.5)
BENZODIAZ UR QL SCN: NEGATIVE
BILIRUB UR QL STRIP: NEGATIVE
BUPRENORPHINE SERPL-MCNC: NEGATIVE NG/ML
CANNABINOIDS SERPL QL: NEGATIVE
CLARITY UR: CLEAR
COCAINE UR QL: NEGATIVE
COLOR UR: YELLOW
CRP SERPL-MCNC: 0.84 MG/DL (ref 0–0.5)
DEPRECATED RDW RBC AUTO: 42.7 FL (ref 37–54)
EOSINOPHIL # BLD AUTO: 1.35 10*3/MM3 (ref 0–0.4)
EOSINOPHIL NFR BLD AUTO: 7.4 % (ref 0.3–6.2)
ERYTHROCYTE [DISTWIDTH] IN BLOOD BY AUTOMATED COUNT: 13.9 % (ref 12.3–15.4)
ETHANOL BLD-MCNC: <10 MG/DL (ref 0–10)
ETHANOL UR QL: <0.01 %
FENTANYL UR-MCNC: NEGATIVE NG/ML
FLUAV RNA RESP QL NAA+PROBE: NOT DETECTED
FLUBV RNA ISLT QL NAA+PROBE: NOT DETECTED
GLUCOSE UR STRIP-MCNC: ABNORMAL MG/DL
HCT VFR BLD AUTO: 48.9 % (ref 37.5–51)
HGB BLD-MCNC: 16.6 G/DL (ref 13–17.7)
HGB UR QL STRIP.AUTO: NEGATIVE
IMM GRANULOCYTES # BLD AUTO: 0.13 10*3/MM3 (ref 0–0.05)
IMM GRANULOCYTES NFR BLD AUTO: 0.7 % (ref 0–0.5)
KETONES UR QL STRIP: NEGATIVE
LEUKOCYTE ESTERASE UR QL STRIP.AUTO: NEGATIVE
LIPASE SERPL-CCNC: 57 U/L (ref 13–60)
LYMPHOCYTES # BLD AUTO: 4.7 10*3/MM3 (ref 0.7–3.1)
LYMPHOCYTES NFR BLD AUTO: 25.8 % (ref 19.6–45.3)
MCH RBC QN AUTO: 28.9 PG (ref 26.6–33)
MCHC RBC AUTO-ENTMCNC: 33.9 G/DL (ref 31.5–35.7)
MCV RBC AUTO: 85 FL (ref 79–97)
METHADONE UR QL SCN: NEGATIVE
MONOCYTES # BLD AUTO: 1.22 10*3/MM3 (ref 0.1–0.9)
MONOCYTES NFR BLD AUTO: 6.7 % (ref 5–12)
NEUTROPHILS NFR BLD AUTO: 10.75 10*3/MM3 (ref 1.7–7)
NEUTROPHILS NFR BLD AUTO: 59 % (ref 42.7–76)
NITRITE UR QL STRIP: NEGATIVE
NRBC BLD AUTO-RTO: 0 /100 WBC (ref 0–0.2)
OPIATES UR QL: NEGATIVE
OXYCODONE UR QL SCN: NEGATIVE
PCP UR QL SCN: NEGATIVE
PH UR STRIP.AUTO: <=5 [PH] (ref 5–8)
PLATELET # BLD AUTO: 209 10*3/MM3 (ref 140–450)
PMV BLD AUTO: 10 FL (ref 6–12)
PROT UR QL STRIP: NEGATIVE
RBC # BLD AUTO: 5.75 10*6/MM3 (ref 4.14–5.8)
SARS-COV-2 RNA RESP QL NAA+PROBE: NOT DETECTED
SP GR UR STRIP: 1.03 (ref 1–1.03)
TRICYCLICS UR QL SCN: NEGATIVE
UROBILINOGEN UR QL STRIP: ABNORMAL
WBC NRBC COR # BLD AUTO: 18.23 10*3/MM3 (ref 3.4–10.8)

## 2024-03-15 PROCEDURE — 81003 URINALYSIS AUTO W/O SCOPE: CPT | Performed by: PHYSICIAN ASSISTANT

## 2024-03-15 PROCEDURE — 87636 SARSCOV2 & INF A&B AMP PRB: CPT | Performed by: PHYSICIAN ASSISTANT

## 2024-03-15 PROCEDURE — 85025 COMPLETE CBC W/AUTO DIFF WBC: CPT | Performed by: PHYSICIAN ASSISTANT

## 2024-03-15 PROCEDURE — 80053 COMPREHEN METABOLIC PANEL: CPT | Performed by: PHYSICIAN ASSISTANT

## 2024-03-15 PROCEDURE — 82077 ASSAY SPEC XCP UR&BREATH IA: CPT | Performed by: PHYSICIAN ASSISTANT

## 2024-03-15 PROCEDURE — 86140 C-REACTIVE PROTEIN: CPT | Performed by: PHYSICIAN ASSISTANT

## 2024-03-15 PROCEDURE — 83690 ASSAY OF LIPASE: CPT | Performed by: PHYSICIAN ASSISTANT

## 2024-03-15 PROCEDURE — 99283 EMERGENCY DEPT VISIT LOW MDM: CPT

## 2024-03-15 PROCEDURE — 80307 DRUG TEST PRSMV CHEM ANLYZR: CPT | Performed by: PHYSICIAN ASSISTANT

## 2024-03-15 PROCEDURE — 36415 COLL VENOUS BLD VENIPUNCTURE: CPT

## 2024-03-16 LAB
ALBUMIN SERPL-MCNC: 4.3 G/DL (ref 3.5–5.2)
ALBUMIN/GLOB SERPL: 1.6 G/DL
ALP SERPL-CCNC: 95 U/L (ref 39–117)
ALT SERPL W P-5'-P-CCNC: 22 U/L (ref 1–41)
ANION GAP SERPL CALCULATED.3IONS-SCNC: 12.2 MMOL/L (ref 5–15)
AST SERPL-CCNC: 20 U/L (ref 1–40)
BILIRUB SERPL-MCNC: 0.5 MG/DL (ref 0–1.2)
BUN SERPL-MCNC: 9 MG/DL (ref 6–20)
BUN/CREAT SERPL: 10.8 (ref 7–25)
CALCIUM SPEC-SCNC: 9 MG/DL (ref 8.6–10.5)
CHLORIDE SERPL-SCNC: 106 MMOL/L (ref 98–107)
CO2 SERPL-SCNC: 22.8 MMOL/L (ref 22–29)
CREAT SERPL-MCNC: 0.83 MG/DL (ref 0.76–1.27)
EGFRCR SERPLBLD CKD-EPI 2021: 112.1 ML/MIN/1.73
GLOBULIN UR ELPH-MCNC: 2.7 GM/DL
GLUCOSE SERPL-MCNC: 116 MG/DL (ref 65–99)
HOLD SPECIMEN: NORMAL
HOLD SPECIMEN: NORMAL
POTASSIUM SERPL-SCNC: 3.8 MMOL/L (ref 3.5–5.2)
PROT SERPL-MCNC: 7 G/DL (ref 6–8.5)
SODIUM SERPL-SCNC: 141 MMOL/L (ref 136–145)
WHOLE BLOOD HOLD COAG: NORMAL
WHOLE BLOOD HOLD SPECIMEN: NORMAL

## 2024-03-16 NOTE — ED NOTES
Patient presented to ED triage desk, alerted ER tech of departure, patient declined request to speak with nurse prior to departure. Patient exited ED at this time.

## 2024-03-16 NOTE — ED NOTES
MEDICAL SCREENING:    Reason for Visit: abdominal pain     Patient initially seen in triage.  The patient was advised further evaluation and diagnostic testing will be needed, some of the treatment and testing will be initiated in the lobby in order to begin the process.  The patient will be returned to the waiting area for the time being and possibly be re-assessed by a subsequent ED provider.  The patient will be brought back to the treatment area in as timely manner as possible.      Della Gaines PA  03/15/24 0412

## 2024-08-26 ENCOUNTER — LAB (OUTPATIENT)
Dept: LAB | Facility: HOSPITAL | Age: 44
End: 2024-08-26
Payer: COMMERCIAL

## 2024-08-26 ENCOUNTER — TRANSCRIBE ORDERS (OUTPATIENT)
Dept: ADMINISTRATIVE | Facility: HOSPITAL | Age: 44
End: 2024-08-26
Payer: COMMERCIAL

## 2024-08-26 DIAGNOSIS — E11.9 DIABETES MELLITUS WITHOUT COMPLICATION: ICD-10-CM

## 2024-08-26 DIAGNOSIS — R53.83 TIREDNESS: ICD-10-CM

## 2024-08-26 DIAGNOSIS — R11.2 NAUSEA AND VOMITING, UNSPECIFIED VOMITING TYPE: ICD-10-CM

## 2024-08-26 DIAGNOSIS — I51.9 MYXEDEMA HEART DISEASE: ICD-10-CM

## 2024-08-26 DIAGNOSIS — E03.9 MYXEDEMA HEART DISEASE: ICD-10-CM

## 2024-08-26 DIAGNOSIS — R19.7 DIARRHEA OF PRESUMED INFECTIOUS ORIGIN: ICD-10-CM

## 2024-08-26 DIAGNOSIS — S90.562A: ICD-10-CM

## 2024-08-26 DIAGNOSIS — R11.2 NAUSEA AND VOMITING, UNSPECIFIED VOMITING TYPE: Primary | ICD-10-CM
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
## 2024-08-26 PROCEDURE — 86618 LYME DISEASE ANTIBODY: CPT

## 2024-08-26 PROCEDURE — 83735 ASSAY OF MAGNESIUM: CPT

## 2024-08-26 PROCEDURE — 87798 DETECT AGENT NOS DNA AMP: CPT

## 2024-08-26 PROCEDURE — 36415 COLL VENOUS BLD VENIPUNCTURE: CPT

## 2024-08-26 PROCEDURE — 84479 ASSAY OF THYROID (T3 OR T4): CPT

## 2024-08-26 PROCEDURE — 83036 HEMOGLOBIN GLYCOSYLATED A1C: CPT

## 2024-08-26 PROCEDURE — 84436 ASSAY OF TOTAL THYROXINE: CPT

## 2024-08-26 PROCEDURE — 87468 ANAPLSMA PHGCYTOPHLM AMP PRB: CPT

## 2024-08-26 PROCEDURE — 82607 VITAMIN B-12: CPT

## 2024-08-26 PROCEDURE — 82627 DEHYDROEPIANDROSTERONE: CPT

## 2024-08-26 PROCEDURE — 80050 GENERAL HEALTH PANEL: CPT

## 2024-08-26 PROCEDURE — 82306 VITAMIN D 25 HYDROXY: CPT

## 2024-08-26 PROCEDURE — 84439 ASSAY OF FREE THYROXINE: CPT

## 2024-08-26 PROCEDURE — 84153 ASSAY OF PSA TOTAL: CPT

## 2024-08-26 PROCEDURE — 87484 EHRLICHA CHAFFEENSIS AMP PRB: CPT

## 2024-08-26 PROCEDURE — 80074 ACUTE HEPATITIS PANEL: CPT

## 2024-08-27 LAB
25(OH)D3 SERPL-MCNC: 42 NG/ML (ref 30–100)
ALBUMIN SERPL-MCNC: 4.7 G/DL (ref 3.5–5.2)
ALBUMIN/GLOB SERPL: 1.8 G/DL
ALP SERPL-CCNC: 102 U/L (ref 39–117)
ALT SERPL W P-5'-P-CCNC: 18 U/L (ref 1–41)
ANION GAP SERPL CALCULATED.3IONS-SCNC: 11 MMOL/L (ref 5–15)
AST SERPL-CCNC: 20 U/L (ref 1–40)
BASOPHILS # BLD AUTO: 0.07 10*3/MM3 (ref 0–0.2)
BASOPHILS NFR BLD AUTO: 0.6 % (ref 0–1.5)
BILIRUB SERPL-MCNC: 0.6 MG/DL (ref 0–1.2)
BUN SERPL-MCNC: 8 MG/DL (ref 6–20)
BUN/CREAT SERPL: 8.7 (ref 7–25)
CALCIUM SPEC-SCNC: 9.4 MG/DL (ref 8.6–10.5)
CHLORIDE SERPL-SCNC: 104 MMOL/L (ref 98–107)
CO2 SERPL-SCNC: 25 MMOL/L (ref 22–29)
CREAT SERPL-MCNC: 0.92 MG/DL (ref 0.76–1.27)
DEPRECATED RDW RBC AUTO: 46 FL (ref 37–54)
EGFRCR SERPLBLD CKD-EPI 2021: 105.8 ML/MIN/1.73
EOSINOPHIL # BLD AUTO: 0.49 10*3/MM3 (ref 0–0.4)
EOSINOPHIL NFR BLD AUTO: 4.1 % (ref 0.3–6.2)
ERYTHROCYTE [DISTWIDTH] IN BLOOD BY AUTOMATED COUNT: 14.1 % (ref 12.3–15.4)
GLOBULIN UR ELPH-MCNC: 2.6 GM/DL
GLUCOSE SERPL-MCNC: 114 MG/DL (ref 65–99)
HAV IGM SERPL QL IA: NORMAL
HBA1C MFR BLD: 5.7 % (ref 4.8–5.6)
HBV CORE IGM SERPL QL IA: NORMAL
HBV SURFACE AG SERPL QL IA: NORMAL
HCT VFR BLD AUTO: 53.6 % (ref 37.5–51)
HCV AB SER QL: NORMAL
HGB BLD-MCNC: 18.3 G/DL (ref 13–17.7)
IMM GRANULOCYTES # BLD AUTO: 0.09 10*3/MM3 (ref 0–0.05)
IMM GRANULOCYTES NFR BLD AUTO: 0.7 % (ref 0–0.5)
LYMPHOCYTES # BLD AUTO: 3.72 10*3/MM3 (ref 0.7–3.1)
LYMPHOCYTES NFR BLD AUTO: 30.8 % (ref 19.6–45.3)
MAGNESIUM SERPL-MCNC: 2.2 MG/DL (ref 1.6–2.6)
MCH RBC QN AUTO: 30.7 PG (ref 26.6–33)
MCHC RBC AUTO-ENTMCNC: 34.1 G/DL (ref 31.5–35.7)
MCV RBC AUTO: 89.9 FL (ref 79–97)
MONOCYTES # BLD AUTO: 0.92 10*3/MM3 (ref 0.1–0.9)
MONOCYTES NFR BLD AUTO: 7.6 % (ref 5–12)
NEUTROPHILS NFR BLD AUTO: 56.2 % (ref 42.7–76)
NEUTROPHILS NFR BLD AUTO: 6.77 10*3/MM3 (ref 1.7–7)
NRBC BLD AUTO-RTO: 0 /100 WBC (ref 0–0.2)
PLATELET # BLD AUTO: 179 10*3/MM3 (ref 140–450)
PMV BLD AUTO: 9.6 FL (ref 6–12)
POTASSIUM SERPL-SCNC: 3.8 MMOL/L (ref 3.5–5.2)
PROT SERPL-MCNC: 7.3 G/DL (ref 6–8.5)
PSA SERPL-MCNC: 0.3 NG/ML (ref 0–4)
RBC # BLD AUTO: 5.96 10*6/MM3 (ref 4.14–5.8)
SODIUM SERPL-SCNC: 140 MMOL/L (ref 136–145)
T4 FREE SERPL-MCNC: 1.82 NG/DL (ref 0.92–1.68)
TSH SERPL DL<=0.05 MIU/L-ACNC: 0.04 UIU/ML (ref 0.27–4.2)
VIT B12 BLD-MCNC: 1142 PG/ML (ref 211–946)
WBC NRBC COR # BLD AUTO: 12.06 10*3/MM3 (ref 3.4–10.8)

## 2024-08-28 ENCOUNTER — LAB (OUTPATIENT)
Dept: LAB | Facility: HOSPITAL | Age: 44
End: 2024-08-28
Payer: COMMERCIAL

## 2024-08-28 DIAGNOSIS — E11.9 DIABETES MELLITUS WITHOUT COMPLICATION: ICD-10-CM

## 2024-08-28 DIAGNOSIS — I51.9 MYXEDEMA HEART DISEASE: ICD-10-CM

## 2024-08-28 DIAGNOSIS — R11.2 NAUSEA AND VOMITING, UNSPECIFIED VOMITING TYPE: ICD-10-CM

## 2024-08-28 DIAGNOSIS — R19.7 DIARRHEA OF PRESUMED INFECTIOUS ORIGIN: ICD-10-CM

## 2024-08-28 DIAGNOSIS — R53.83 TIREDNESS: ICD-10-CM
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
## 2024-08-28 DIAGNOSIS — E03.9 MYXEDEMA HEART DISEASE: ICD-10-CM

## 2024-08-28 DIAGNOSIS — S90.562A: ICD-10-CM

## 2024-08-28 LAB
027 TOXIN: NORMAL
C DIFF TOX GENS STL QL NAA+PROBE: NEGATIVE

## 2024-08-28 PROCEDURE — 87045 FECES CULTURE AEROBIC BACT: CPT

## 2024-08-28 PROCEDURE — 87046 STOOL CULTR AEROBIC BACT EA: CPT

## 2024-08-28 PROCEDURE — 87427 SHIGA-LIKE TOXIN AG IA: CPT

## 2024-08-28 PROCEDURE — 87209 SMEAR COMPLEX STAIN: CPT

## 2024-08-28 PROCEDURE — 87177 OVA AND PARASITES SMEARS: CPT

## 2024-08-28 PROCEDURE — 87493 C DIFF AMPLIFIED PROBE: CPT

## 2024-08-29 LAB
B BURGDOR IGG+IGM SER QL IA: NEGATIVE
DHEA-S SERPL-MCNC: 128 UG/DL (ref 102.6–416.3)
FT4I SERPL CALC-MCNC: 4 (ref 1.2–4.9)
T3RU NFR SERPL: 33 % (ref 24–39)
T4 SERPL-MCNC: 12 UG/DL (ref 4.5–12)

## 2024-08-30 LAB
A PHAGOCYTOPH DNA BLD QL NAA+PROBE: NEGATIVE
E CHAFFEENSIS DNA BLD QL NAA+PROBE: NEGATIVE

## 2024-09-01 LAB
BACTERIA SPEC CULT: NORMAL
BACTERIA SPEC CULT: NORMAL
CAMPYLOBACTER STL CULT: NORMAL
E COLI SXT STL QL IA: NEGATIVE
RICKETTSIA RICKETTSII DNA, RT: NOT DETECTED
SALM + SHIG STL CULT: NORMAL

## 2024-09-04 LAB
O+P SPEC MICRO: NORMAL
O+P STL CONC: NORMAL

## 2025-06-04 ENCOUNTER — LAB (OUTPATIENT)
Dept: LAB | Facility: HOSPITAL | Age: 45
End: 2025-06-04
Payer: COMMERCIAL

## 2025-06-04 ENCOUNTER — TRANSCRIBE ORDERS (OUTPATIENT)
Dept: ADMINISTRATIVE | Facility: HOSPITAL | Age: 45
End: 2025-06-04
Payer: COMMERCIAL

## 2025-06-04 DIAGNOSIS — I10 ESSENTIAL HYPERTENSION, MALIGNANT: ICD-10-CM

## 2025-06-04 DIAGNOSIS — E03.9 HYPOTHYROIDISM, UNSPECIFIED TYPE: ICD-10-CM

## 2025-06-04 DIAGNOSIS — E11.9 DIABETES MELLITUS WITHOUT COMPLICATION: ICD-10-CM

## 2025-06-04 DIAGNOSIS — E03.9 HYPOTHYROIDISM, UNSPECIFIED TYPE: Primary | ICD-10-CM

## 2025-06-04 LAB
25(OH)D3 SERPL-MCNC: 30.5 NG/ML (ref 30–100)
ALBUMIN SERPL-MCNC: 4.5 G/DL (ref 3.5–5.2)
ALBUMIN/GLOB SERPL: 1.8 G/DL
ALP SERPL-CCNC: 113 U/L (ref 39–117)
ALT SERPL W P-5'-P-CCNC: 24 U/L (ref 1–41)
ANION GAP SERPL CALCULATED.3IONS-SCNC: 14.1 MMOL/L (ref 5–15)
AST SERPL-CCNC: 24 U/L (ref 1–40)
BASOPHILS # BLD AUTO: 0.1 10*3/MM3 (ref 0–0.2)
BASOPHILS NFR BLD AUTO: 0.8 % (ref 0–1.5)
BILIRUB SERPL-MCNC: 0.6 MG/DL (ref 0–1.2)
BUN SERPL-MCNC: 10 MG/DL (ref 6–20)
BUN/CREAT SERPL: 10.8 (ref 7–25)
CALCIUM SPEC-SCNC: 8.7 MG/DL (ref 8.6–10.5)
CHLORIDE SERPL-SCNC: 103 MMOL/L (ref 98–107)
CHOLEST SERPL-MCNC: 157 MG/DL (ref 0–200)
CO2 SERPL-SCNC: 19.9 MMOL/L (ref 22–29)
CREAT SERPL-MCNC: 0.93 MG/DL (ref 0.76–1.27)
DEPRECATED RDW RBC AUTO: 44.1 FL (ref 37–54)
EGFRCR SERPLBLD CKD-EPI 2021: 104.5 ML/MIN/1.73
EOSINOPHIL # BLD AUTO: 0.25 10*3/MM3 (ref 0–0.4)
EOSINOPHIL NFR BLD AUTO: 1.9 % (ref 0.3–6.2)
ERYTHROCYTE [DISTWIDTH] IN BLOOD BY AUTOMATED COUNT: 13.5 % (ref 12.3–15.4)
GLOBULIN UR ELPH-MCNC: 2.5 GM/DL
GLUCOSE SERPL-MCNC: 219 MG/DL (ref 65–99)
HBA1C MFR BLD: 6.7 % (ref 4.8–5.6)
HCT VFR BLD AUTO: 52.6 % (ref 37.5–51)
HDLC SERPL-MCNC: 36 MG/DL (ref 40–60)
HGB BLD-MCNC: 17.8 G/DL (ref 13–17.7)
IMM GRANULOCYTES # BLD AUTO: 0.17 10*3/MM3 (ref 0–0.05)
IMM GRANULOCYTES NFR BLD AUTO: 1.3 % (ref 0–0.5)
LDLC SERPL CALC-MCNC: 85 MG/DL (ref 0–100)
LDLC/HDLC SERPL: 2.17 {RATIO}
LYMPHOCYTES # BLD AUTO: 3.17 10*3/MM3 (ref 0.7–3.1)
LYMPHOCYTES NFR BLD AUTO: 24.7 % (ref 19.6–45.3)
MCH RBC QN AUTO: 30.5 PG (ref 26.6–33)
MCHC RBC AUTO-ENTMCNC: 33.8 G/DL (ref 31.5–35.7)
MCV RBC AUTO: 90.1 FL (ref 79–97)
MONOCYTES # BLD AUTO: 0.95 10*3/MM3 (ref 0.1–0.9)
MONOCYTES NFR BLD AUTO: 7.4 % (ref 5–12)
NEUTROPHILS NFR BLD AUTO: 63.9 % (ref 42.7–76)
NEUTROPHILS NFR BLD AUTO: 8.22 10*3/MM3 (ref 1.7–7)
NRBC BLD AUTO-RTO: 0 /100 WBC (ref 0–0.2)
PLATELET # BLD AUTO: 203 10*3/MM3 (ref 140–450)
PMV BLD AUTO: 10.1 FL (ref 6–12)
POTASSIUM SERPL-SCNC: 3.9 MMOL/L (ref 3.5–5.2)
PROT SERPL-MCNC: 7 G/DL (ref 6–8.5)
RBC # BLD AUTO: 5.84 10*6/MM3 (ref 4.14–5.8)
SODIUM SERPL-SCNC: 137 MMOL/L (ref 136–145)
T3FREE SERPL-MCNC: 2.82 PG/ML (ref 2–4.4)
T4 FREE SERPL-MCNC: 1.79 NG/DL (ref 0.92–1.68)
TRIGL SERPL-MCNC: 214 MG/DL (ref 0–150)
TSH SERPL DL<=0.05 MIU/L-ACNC: 0.68 UIU/ML (ref 0.27–4.2)
VIT B12 BLD-MCNC: 723 PG/ML (ref 211–946)
VLDLC SERPL-MCNC: 36 MG/DL (ref 5–40)
WBC NRBC COR # BLD AUTO: 12.86 10*3/MM3 (ref 3.4–10.8)

## 2025-06-04 PROCEDURE — 80061 LIPID PANEL: CPT

## 2025-06-04 PROCEDURE — 84481 FREE ASSAY (FT-3): CPT

## 2025-06-04 PROCEDURE — 80050 GENERAL HEALTH PANEL: CPT

## 2025-06-04 PROCEDURE — 36415 COLL VENOUS BLD VENIPUNCTURE: CPT

## 2025-06-04 PROCEDURE — 84439 ASSAY OF FREE THYROXINE: CPT

## 2025-06-04 PROCEDURE — 83036 HEMOGLOBIN GLYCOSYLATED A1C: CPT

## 2025-06-04 PROCEDURE — 82607 VITAMIN B-12: CPT

## 2025-06-04 PROCEDURE — 82306 VITAMIN D 25 HYDROXY: CPT

## (undated) DEVICE — GLV SURG PREMIERPRO MIC LTX PF SZ8 BRN

## (undated) DEVICE — DRSNG WND GZ CURAD OIL EMULSION 3X8IN LF STRL 1PK

## (undated) DEVICE — SUT ETHLN 3-0 FS118IN 663H

## (undated) DEVICE — NDL HYPO ECLPS SFTY 18G 1 1/2IN

## (undated) DEVICE — NDL SPINE 22G 31/2IN BLK

## (undated) DEVICE — PAD WRAP/ON KN COOL/THERP W/ELAS/STRAP LF

## (undated) DEVICE — TBG PENCL TELESCP MEGADYNE SMOKE EVAC 10FT

## (undated) DEVICE — TBG PUMP ARTHSCP MAIN AR6400 16FT

## (undated) DEVICE — PK KN ARTHSCP 70

## (undated) DEVICE — HOLDER: Brand: DEROYAL

## (undated) DEVICE — SYS COLD/THRP POLAR/CARE/CUBE

## (undated) DEVICE — BLD CUT FORMLA AGGR PLS 4.0MM

## (undated) DEVICE — BNDG ELAS CO-FLEX SLF ADHR 4IN5YD LF STRL

## (undated) DEVICE — PATIENT RETURN ELECTRODE, SINGLE-USE, CONTACT QUALITY MONITORING, ADULT, WITH 9FT CORD, FOR PATIENTS WEIGING OVER 33LBS. (15KG): Brand: MEGADYNE

## (undated) DEVICE — APPL CHLORAPREP HI/LITE 26ML ORNG

## (undated) DEVICE — NDL HYPO ECLPS SFTY 22G 1 1/2IN